# Patient Record
Sex: MALE | Employment: UNEMPLOYED | URBAN - METROPOLITAN AREA
[De-identification: names, ages, dates, MRNs, and addresses within clinical notes are randomized per-mention and may not be internally consistent; named-entity substitution may affect disease eponyms.]

---

## 2022-01-10 ENCOUNTER — APPOINTMENT (OUTPATIENT)
Dept: RADIOLOGY | Facility: MEDICAL CENTER | Age: 52
DRG: 246 | End: 2022-01-10
Attending: EMERGENCY MEDICINE
Payer: COMMERCIAL

## 2022-01-10 ENCOUNTER — HOSPITAL ENCOUNTER (INPATIENT)
Facility: MEDICAL CENTER | Age: 52
LOS: 2 days | DRG: 246 | End: 2022-01-12
Attending: INTERNAL MEDICINE | Admitting: INTERNAL MEDICINE
Payer: COMMERCIAL

## 2022-01-10 ENCOUNTER — APPOINTMENT (OUTPATIENT)
Dept: CARDIOLOGY | Facility: MEDICAL CENTER | Age: 52
DRG: 246 | End: 2022-01-10
Attending: INTERNAL MEDICINE
Payer: COMMERCIAL

## 2022-01-10 DIAGNOSIS — I21.19 ST ELEVATION MYOCARDIAL INFARCTION (STEMI) INVOLVING OTHER CORONARY ARTERY OF INFERIOR WALL (HCC): ICD-10-CM

## 2022-01-10 PROBLEM — E78.5 HYPERLIPIDEMIA: Status: ACTIVE | Noted: 2022-01-10

## 2022-01-10 PROBLEM — I10 HYPERTENSION: Status: ACTIVE | Noted: 2022-01-10

## 2022-01-10 PROBLEM — I50.20 SYSTOLIC HEART FAILURE (HCC): Status: ACTIVE | Noted: 2022-01-10

## 2022-01-10 PROBLEM — I21.3 STEMI (ST ELEVATION MYOCARDIAL INFARCTION) (HCC): Status: ACTIVE | Noted: 2022-01-10

## 2022-01-10 LAB
ACT BLD: 166 SEC (ref 74–137)
ALBUMIN SERPL BCP-MCNC: 4.7 G/DL (ref 3.2–4.9)
ALBUMIN/GLOB SERPL: 1.8 G/DL
ALP SERPL-CCNC: 80 U/L (ref 30–99)
ALT SERPL-CCNC: 50 U/L (ref 2–50)
ANION GAP SERPL CALC-SCNC: 15 MMOL/L (ref 7–16)
AST SERPL-CCNC: 36 U/L (ref 12–45)
BASOPHILS # BLD AUTO: 0.3 % (ref 0–1.8)
BASOPHILS # BLD: 0.04 K/UL (ref 0–0.12)
BILIRUB SERPL-MCNC: 0.6 MG/DL (ref 0.1–1.5)
BUN SERPL-MCNC: 15 MG/DL (ref 8–22)
CALCIUM SERPL-MCNC: 9.5 MG/DL (ref 8.5–10.5)
CHLORIDE SERPL-SCNC: 101 MMOL/L (ref 96–112)
CO2 SERPL-SCNC: 22 MMOL/L (ref 20–33)
CREAT SERPL-MCNC: 0.97 MG/DL (ref 0.5–1.4)
EKG IMPRESSION: NORMAL
EOSINOPHIL # BLD AUTO: 0.07 K/UL (ref 0–0.51)
EOSINOPHIL NFR BLD: 0.5 % (ref 0–6.9)
ERYTHROCYTE [DISTWIDTH] IN BLOOD BY AUTOMATED COUNT: 39.3 FL (ref 35.9–50)
GLOBULIN SER CALC-MCNC: 2.6 G/DL (ref 1.9–3.5)
GLUCOSE SERPL-MCNC: 149 MG/DL (ref 65–99)
HCT VFR BLD AUTO: 43.3 % (ref 42–52)
HGB BLD-MCNC: 15.1 G/DL (ref 14–18)
IMM GRANULOCYTES # BLD AUTO: 0.07 K/UL (ref 0–0.11)
IMM GRANULOCYTES NFR BLD AUTO: 0.5 % (ref 0–0.9)
LYMPHOCYTES # BLD AUTO: 1.76 K/UL (ref 1–4.8)
LYMPHOCYTES NFR BLD: 11.9 % (ref 22–41)
MCH RBC QN AUTO: 30 PG (ref 27–33)
MCHC RBC AUTO-ENTMCNC: 34.9 G/DL (ref 33.7–35.3)
MCV RBC AUTO: 86.1 FL (ref 81.4–97.8)
MONOCYTES # BLD AUTO: 0.86 K/UL (ref 0–0.85)
MONOCYTES NFR BLD AUTO: 5.8 % (ref 0–13.4)
NEUTROPHILS # BLD AUTO: 12.05 K/UL (ref 1.82–7.42)
NEUTROPHILS NFR BLD: 81 % (ref 44–72)
NRBC # BLD AUTO: 0 K/UL
NRBC BLD-RTO: 0 /100 WBC
PLATELET # BLD AUTO: 386 K/UL (ref 164–446)
PMV BLD AUTO: 9.4 FL (ref 9–12.9)
POTASSIUM SERPL-SCNC: 3.7 MMOL/L (ref 3.6–5.5)
PROT SERPL-MCNC: 7.3 G/DL (ref 6–8.2)
RBC # BLD AUTO: 5.03 M/UL (ref 4.7–6.1)
SODIUM SERPL-SCNC: 138 MMOL/L (ref 135–145)
TROPONIN T SERPL-MCNC: 13 NG/L (ref 6–19)
WBC # BLD AUTO: 14.9 K/UL (ref 4.8–10.8)

## 2022-01-10 PROCEDURE — 700105 HCHG RX REV CODE 258: Performed by: INTERNAL MEDICINE

## 2022-01-10 PROCEDURE — 80053 COMPREHEN METABOLIC PANEL: CPT

## 2022-01-10 PROCEDURE — 93458 L HRT ARTERY/VENTRICLE ANGIO: CPT | Mod: 26,59 | Performed by: INTERNAL MEDICINE

## 2022-01-10 PROCEDURE — 700101 HCHG RX REV CODE 250

## 2022-01-10 PROCEDURE — 99221 1ST HOSP IP/OBS SF/LOW 40: CPT | Performed by: STUDENT IN AN ORGANIZED HEALTH CARE EDUCATION/TRAINING PROGRAM

## 2022-01-10 PROCEDURE — 700102 HCHG RX REV CODE 250 W/ 637 OVERRIDE(OP): Performed by: INTERNAL MEDICINE

## 2022-01-10 PROCEDURE — 92941 PRQ TRLML REVSC TOT OCCL AMI: CPT | Mod: LC | Performed by: INTERNAL MEDICINE

## 2022-01-10 PROCEDURE — 700117 HCHG RX CONTRAST REV CODE 255: Performed by: INTERNAL MEDICINE

## 2022-01-10 PROCEDURE — 71045 X-RAY EXAM CHEST 1 VIEW: CPT

## 2022-01-10 PROCEDURE — 027034Z DILATION OF CORONARY ARTERY, ONE ARTERY WITH DRUG-ELUTING INTRALUMINAL DEVICE, PERCUTANEOUS APPROACH: ICD-10-PCS | Performed by: INTERNAL MEDICINE

## 2022-01-10 PROCEDURE — 99221 1ST HOSP IP/OBS SF/LOW 40: CPT | Mod: 25 | Performed by: INTERNAL MEDICINE

## 2022-01-10 PROCEDURE — B2151ZZ FLUOROSCOPY OF LEFT HEART USING LOW OSMOLAR CONTRAST: ICD-10-PCS | Performed by: INTERNAL MEDICINE

## 2022-01-10 PROCEDURE — B240ZZ3 ULTRASONOGRAPHY OF SINGLE CORONARY ARTERY, INTRAVASCULAR: ICD-10-PCS | Performed by: INTERNAL MEDICINE

## 2022-01-10 PROCEDURE — 92978 ENDOLUMINL IVUS OCT C 1ST: CPT | Mod: 26,LC | Performed by: INTERNAL MEDICINE

## 2022-01-10 PROCEDURE — 93005 ELECTROCARDIOGRAM TRACING: CPT | Performed by: EMERGENCY MEDICINE

## 2022-01-10 PROCEDURE — 85025 COMPLETE CBC W/AUTO DIFF WBC: CPT

## 2022-01-10 PROCEDURE — A9270 NON-COVERED ITEM OR SERVICE: HCPCS | Performed by: STUDENT IN AN ORGANIZED HEALTH CARE EDUCATION/TRAINING PROGRAM

## 2022-01-10 PROCEDURE — 700111 HCHG RX REV CODE 636 W/ 250 OVERRIDE (IP)

## 2022-01-10 PROCEDURE — A9270 NON-COVERED ITEM OR SERVICE: HCPCS | Performed by: INTERNAL MEDICINE

## 2022-01-10 PROCEDURE — 700102 HCHG RX REV CODE 250 W/ 637 OVERRIDE(OP)

## 2022-01-10 PROCEDURE — C9606 PERC D-E COR REVASC W AMI S: HCPCS | Mod: LC

## 2022-01-10 PROCEDURE — A9270 NON-COVERED ITEM OR SERVICE: HCPCS

## 2022-01-10 PROCEDURE — 700102 HCHG RX REV CODE 250 W/ 637 OVERRIDE(OP): Performed by: STUDENT IN AN ORGANIZED HEALTH CARE EDUCATION/TRAINING PROGRAM

## 2022-01-10 PROCEDURE — 84484 ASSAY OF TROPONIN QUANT: CPT

## 2022-01-10 PROCEDURE — 770000 HCHG ROOM/CARE - INTERMEDIATE ICU *

## 2022-01-10 PROCEDURE — 85347 COAGULATION TIME ACTIVATED: CPT

## 2022-01-10 PROCEDURE — 4A023N7 MEASUREMENT OF CARDIAC SAMPLING AND PRESSURE, LEFT HEART, PERCUTANEOUS APPROACH: ICD-10-PCS | Performed by: INTERNAL MEDICINE

## 2022-01-10 PROCEDURE — B2111ZZ FLUOROSCOPY OF MULTIPLE CORONARY ARTERIES USING LOW OSMOLAR CONTRAST: ICD-10-PCS | Performed by: INTERNAL MEDICINE

## 2022-01-10 PROCEDURE — 99152 MOD SED SAME PHYS/QHP 5/>YRS: CPT | Performed by: INTERNAL MEDICINE

## 2022-01-10 RX ORDER — HEPARIN SODIUM 1000 [USP'U]/ML
INJECTION, SOLUTION INTRAVENOUS; SUBCUTANEOUS
Status: COMPLETED
Start: 2022-01-10 | End: 2022-01-10

## 2022-01-10 RX ORDER — MAGNESIUM OXIDE 400 MG/1
400 TABLET ORAL DAILY
Status: ON HOLD | COMMUNITY
End: 2022-01-10

## 2022-01-10 RX ORDER — ASPIRIN 300 MG/1
300 SUPPOSITORY RECTAL DAILY
Status: DISCONTINUED | OUTPATIENT
Start: 2022-01-10 | End: 2022-01-10 | Stop reason: CLARIF

## 2022-01-10 RX ORDER — PRASUGREL 10 MG/1
60 TABLET, FILM COATED ORAL ONCE
Status: DISCONTINUED | OUTPATIENT
Start: 2022-01-10 | End: 2022-01-10

## 2022-01-10 RX ORDER — CALCIUM POLYCARBOPHIL 625 MG 625 MG/1
1250 TABLET ORAL
COMMUNITY

## 2022-01-10 RX ORDER — LISINOPRIL 5 MG/1
5 TABLET ORAL TWICE DAILY
Status: DISCONTINUED | OUTPATIENT
Start: 2022-01-10 | End: 2022-01-12

## 2022-01-10 RX ORDER — ATORVASTATIN CALCIUM 20 MG/1
20 TABLET, FILM COATED ORAL EVERY EVENING
COMMUNITY
End: 2022-01-28

## 2022-01-10 RX ORDER — ATORVASTATIN CALCIUM 80 MG/1
80 TABLET, FILM COATED ORAL EVERY EVENING
Status: DISCONTINUED | OUTPATIENT
Start: 2022-01-10 | End: 2022-01-12 | Stop reason: HOSPADM

## 2022-01-10 RX ORDER — ASPIRIN 81 MG/1
324 TABLET, CHEWABLE ORAL DAILY
Status: DISCONTINUED | OUTPATIENT
Start: 2022-01-10 | End: 2022-01-10 | Stop reason: CLARIF

## 2022-01-10 RX ORDER — CAPSAICIN 0.025 %
1 CREAM (GRAM) TOPICAL 3 TIMES DAILY PRN
COMMUNITY
End: 2022-05-19

## 2022-01-10 RX ORDER — IBUPROFEN 600 MG/1
600 TABLET ORAL 3 TIMES DAILY PRN
COMMUNITY
End: 2022-01-28

## 2022-01-10 RX ORDER — SODIUM CHLORIDE 9 MG/ML
INJECTION, SOLUTION INTRAVENOUS CONTINUOUS
Status: CANCELLED | OUTPATIENT
Start: 2022-01-10 | End: 2022-01-10

## 2022-01-10 RX ORDER — PRASUGREL 10 MG/1
60 TABLET, FILM COATED ORAL ONCE
Status: CANCELLED | OUTPATIENT
Start: 2022-01-10 | End: 2022-01-10

## 2022-01-10 RX ORDER — TRIAMCINOLONE ACETONIDE 0.25 MG/G
1 CREAM TOPICAL 2 TIMES DAILY PRN
COMMUNITY
End: 2022-05-19

## 2022-01-10 RX ORDER — SODIUM CHLORIDE 9 MG/ML
INJECTION, SOLUTION INTRAVENOUS CONTINUOUS
Status: ACTIVE | OUTPATIENT
Start: 2022-01-10 | End: 2022-01-10

## 2022-01-10 RX ORDER — PRASUGREL 10 MG/1
10 TABLET, FILM COATED ORAL DAILY
Status: CANCELLED | OUTPATIENT
Start: 2022-01-11

## 2022-01-10 RX ORDER — ACETAMINOPHEN 325 MG/1
650 TABLET ORAL EVERY 6 HOURS PRN
Status: DISCONTINUED | OUTPATIENT
Start: 2022-01-10 | End: 2022-01-12 | Stop reason: HOSPADM

## 2022-01-10 RX ORDER — BIVALIRUDIN 250 MG/5ML
INJECTION, POWDER, LYOPHILIZED, FOR SOLUTION INTRAVENOUS
Status: COMPLETED
Start: 2022-01-10 | End: 2022-01-10

## 2022-01-10 RX ORDER — PRASUGREL 10 MG/1
10 TABLET, FILM COATED ORAL DAILY
Status: DISCONTINUED | OUTPATIENT
Start: 2022-01-11 | End: 2022-01-12 | Stop reason: HOSPADM

## 2022-01-10 RX ORDER — LIDOCAINE HYDROCHLORIDE 20 MG/ML
INJECTION, SOLUTION INFILTRATION; PERINEURAL
Status: COMPLETED
Start: 2022-01-10 | End: 2022-01-10

## 2022-01-10 RX ORDER — CARVEDILOL 6.25 MG/1
6.25 TABLET ORAL 2 TIMES DAILY WITH MEALS
Status: DISCONTINUED | OUTPATIENT
Start: 2022-01-10 | End: 2022-01-12 | Stop reason: HOSPADM

## 2022-01-10 RX ORDER — HEPARIN SODIUM 200 [USP'U]/100ML
INJECTION, SOLUTION INTRAVENOUS
Status: COMPLETED
Start: 2022-01-10 | End: 2022-01-10

## 2022-01-10 RX ORDER — NAPROXEN 250 MG/1
500-750 TABLET ORAL PRN
Status: ON HOLD | COMMUNITY
End: 2022-01-10

## 2022-01-10 RX ORDER — VERAPAMIL HYDROCHLORIDE 2.5 MG/ML
INJECTION, SOLUTION INTRAVENOUS
Status: COMPLETED
Start: 2022-01-10 | End: 2022-01-10

## 2022-01-10 RX ORDER — MIDAZOLAM HYDROCHLORIDE 1 MG/ML
INJECTION INTRAMUSCULAR; INTRAVENOUS
Status: COMPLETED
Start: 2022-01-10 | End: 2022-01-10

## 2022-01-10 RX ORDER — PRASUGREL 10 MG/1
TABLET, FILM COATED ORAL
Status: COMPLETED
Start: 2022-01-10 | End: 2022-01-10

## 2022-01-10 RX ORDER — ASPIRIN 325 MG
325 TABLET ORAL DAILY
Status: DISCONTINUED | OUTPATIENT
Start: 2022-01-10 | End: 2022-01-10 | Stop reason: CLARIF

## 2022-01-10 RX ADMIN — LISINOPRIL 5 MG: 5 TABLET ORAL at 13:56

## 2022-01-10 RX ADMIN — SODIUM CHLORIDE: 9 INJECTION, SOLUTION INTRAVENOUS at 13:55

## 2022-01-10 RX ADMIN — ATROPINE SULFATE 0.5 MG: 0.1 INJECTION INTRAVENOUS at 12:46

## 2022-01-10 RX ADMIN — VERAPAMIL HYDROCHLORIDE 2.5 MG: 2.5 INJECTION, SOLUTION INTRAVENOUS at 12:28

## 2022-01-10 RX ADMIN — ACETAMINOPHEN 650 MG: 325 TABLET, FILM COATED ORAL at 17:15

## 2022-01-10 RX ADMIN — HEPARIN SODIUM: 1000 INJECTION, SOLUTION INTRAVENOUS; SUBCUTANEOUS at 12:27

## 2022-01-10 RX ADMIN — IOHEXOL 100 ML: 350 INJECTION, SOLUTION INTRAVENOUS at 12:47

## 2022-01-10 RX ADMIN — CARVEDILOL 6.25 MG: 6.25 TABLET, FILM COATED ORAL at 17:15

## 2022-01-10 RX ADMIN — PRASUGREL 60 MG: 10 TABLET, FILM COATED ORAL at 12:47

## 2022-01-10 RX ADMIN — ASPIRIN 81 MG: 81 TABLET, COATED ORAL at 13:56

## 2022-01-10 RX ADMIN — FENTANYL CITRATE 100 MCG: 50 INJECTION INTRAMUSCULAR; INTRAVENOUS at 12:42

## 2022-01-10 RX ADMIN — SODIUM CHLORIDE: 9 INJECTION, SOLUTION INTRAVENOUS at 17:16

## 2022-01-10 RX ADMIN — MIDAZOLAM 2 MG: 1 INJECTION INTRAMUSCULAR; INTRAVENOUS at 12:42

## 2022-01-10 RX ADMIN — BIVALIRUDIN 250 MG: 250 INJECTION, POWDER, LYOPHILIZED, FOR SOLUTION INTRAVENOUS at 12:42

## 2022-01-10 RX ADMIN — HEPARIN SODIUM 2000 UNITS: 200 INJECTION, SOLUTION INTRAVENOUS at 12:27

## 2022-01-10 RX ADMIN — ATORVASTATIN CALCIUM 80 MG: 80 TABLET, FILM COATED ORAL at 17:15

## 2022-01-10 RX ADMIN — LIDOCAINE HYDROCHLORIDE: 20 INJECTION, SOLUTION INFILTRATION; PERINEURAL at 12:28

## 2022-01-10 RX ADMIN — NITROGLYCERIN: 20 INJECTION INTRAVENOUS at 12:00

## 2022-01-10 ASSESSMENT — ENCOUNTER SYMPTOMS
DIZZINESS: 0
CHEST TIGHTNESS: 1
HEADACHES: 0
NAUSEA: 0
PALPITATIONS: 0
SHORTNESS OF BREATH: 0
ABDOMINAL PAIN: 0

## 2022-01-10 ASSESSMENT — PAIN DESCRIPTION - PAIN TYPE
TYPE: ACUTE PAIN

## 2022-01-10 ASSESSMENT — PATIENT HEALTH QUESTIONNAIRE - PHQ9
1. LITTLE INTEREST OR PLEASURE IN DOING THINGS: NOT AT ALL
SUM OF ALL RESPONSES TO PHQ9 QUESTIONS 1 AND 2: 0
2. FEELING DOWN, DEPRESSED, IRRITABLE, OR HOPELESS: NOT AT ALL

## 2022-01-10 ASSESSMENT — LIFESTYLE VARIABLES
EVER HAD A DRINK FIRST THING IN THE MORNING TO STEADY YOUR NERVES TO GET RID OF A HANGOVER: NO
TOTAL SCORE: 0
AVERAGE NUMBER OF DAYS PER WEEK YOU HAVE A DRINK CONTAINING ALCOHOL: 0
EVER FELT BAD OR GUILTY ABOUT YOUR DRINKING: NO
CONSUMPTION TOTAL: NEGATIVE
TOTAL SCORE: 0
TOTAL SCORE: 0
ON A TYPICAL DAY WHEN YOU DRINK ALCOHOL HOW MANY DRINKS DO YOU HAVE: 0
HOW MANY TIMES IN THE PAST YEAR HAVE YOU HAD 5 OR MORE DRINKS IN A DAY: 0
ALCOHOL_USE: NO
HAVE YOU EVER FELT YOU SHOULD CUT DOWN ON YOUR DRINKING: NO
DOES PATIENT WANT TO STOP DRINKING: NO
HAVE PEOPLE ANNOYED YOU BY CRITICIZING YOUR DRINKING: NO

## 2022-01-10 ASSESSMENT — FIBROSIS 4 INDEX: FIB4 SCORE: 0.69

## 2022-01-10 NOTE — PROGRESS NOTES
4 Eyes Skin Assessment Completed by SOFIA Gallagher and SOFIA Meneses.    Head WDL  Ears WDL  Nose WDL  Mouth WDL  Neck WDL  Breast/Chest WDL  Shoulder Blades WDL  Spine WDL  (R) Arm/Elbow/Hand WDL  (L) Arm/Elbow/Hand WDL  Abdomen WDL  Groin WDL  Scrotum/Coccyx/Buttocks WDL  (R) Leg WDL  (L) Leg WDL  (R) Heel/Foot/Toe WDL  (L) Heel/Foot/Toe WDL          Devices In Places ECG, Blood Pressure Cuff and Pulse Ox      Interventions In Place Q2 Turns    Possible Skin Injury No    Pictures Uploaded Into Epic N/A  Wound Consult Placed N/A  RN Wound Prevention Protocol Ordered No

## 2022-01-10 NOTE — CONSULTS
Cardiology Initial Consultation    Date of Service  1/10/2022    Referring Physician  Adrianna Macaky M.D.    Reason for Consultation  Inferior STEMI    History of Presenting Illness  Three [place] is a 51 y.o. male with a past medical history of hypertension, dyslipidemia and remote tobacco use who has been transferred from [facility] on 1/10/2022 with the development of chest pain well exercising, working out around 930 this morning radiating to his left back and arm with shortness of breath.  EMS was contacted.  The patient received aspirin and 3 nitroglycerin with persistent pain.  EKG showed inferior ST elevation.    The patient continues to have chest pain.  Vital signs are stable.  EKG continues to show inferior ST elevation with high lateral reciprocal changes.  The patient has no prior history of heart disease.  He denies any current drug use.  No family history of premature heart disease.  Currently on HCTZ and atorvastatin.    Review of Systems  Review of Systems   Respiratory: Positive for chest tightness. Negative for shortness of breath.    Cardiovascular: Negative for palpitations.   Gastrointestinal: Negative for abdominal pain and nausea.   Neurological: Negative for dizziness and headaches.       Past Medical History   has no past medical history on file.    Surgical History   has no past surgical history on file.    Family History  No family history of heart disease.    Social History   See above    Medications  None       Allergies  Allergies   Allergen Reactions   • Penicillins Unspecified     Unknown reaction from childhood       Vital signs in last 24 hours   /67.  Pulse 76.    Physical Exam  Physical Exam  Constitutional:       General: He is not in acute distress.  HENT:      Head: Normocephalic.   Eyes:      General: No scleral icterus.     Conjunctiva/sclera: Conjunctivae normal.      Pupils: Pupils are equal, round, and reactive to light.   Neck:      Thyroid: No  thyromegaly.      Vascular: No carotid bruit.      Comments: Normal jugular venous pressure.  Cardiovascular:      Rate and Rhythm: Normal rate and regular rhythm.      Pulses:           Carotid pulses are 1+ on the right side and 1+ on the left side.       Radial pulses are 1+ on the right side and 1+ on the left side.        Posterior tibial pulses are 1+ on the right side and 1+ on the left side.      Heart sounds: S1 normal and S2 normal. No murmur heard.  No friction rub. No gallop.    Pulmonary:      Effort: Pulmonary effort is normal.      Breath sounds: Normal breath sounds. No wheezing, rhonchi or rales.   Abdominal:      General: Bowel sounds are normal. There is no abdominal bruit.      Palpations: Abdomen is soft. There is no mass or pulsatile mass.      Tenderness: There is no abdominal tenderness.   Lymphadenopathy:      Cervical: No cervical adenopathy.   Skin:     General: Skin is warm and dry.      Nails: There is no clubbing.   Neurological:      Mental Status: He is alert and oriented to person, place, and time.   Psychiatric:         Behavior: Behavior normal.         Lab Review  No results found for: WBC, RBC, HEMOGLOBIN, HEMATOCRIT, MCV, MCH, MCHC, MPV   No results found for: SODIUM, POTASSIUM, CHLORIDE, CO2, GLUCOSE, BUN, CREATININE, BUNCREATRAT, GLOMRATE   No results found for: ASTSGOT, ALTSGPT  No results found for: CHOLSTRLTOT, LDL, HDL, TRIGLYCERIDE, TROPONINT    No results for input(s): NTPROBNP in the last 72 hours.    Cardiac Imaging and Procedures Review  EKG:  My personal interpretation of the EKG dated 1/10/2022 is sinus rhythm with an acute injury current with ST elevation and reciprocal high lateral ST segment depression.    Imaging  Chest X-Ray: 1/10/2022  No acute abnormalities.    Assessment  1.  Anterior STEMI.  2.  Hypertension.  3.  Dyslipidemia.  4.  Remote tobacco use.    Recommendation Discussion  1.  The patient is a 51-year-old male with history of hypertension  dyslipidemia prior tobacco use who presents with an acute inferior myocardial infarction.  2.  I had a detailed concise discussion with the patient informing of his current critical cardiac condition and recommendation for emergent cardiac catheterization which she expressed understanding and wished to proceed.  3.  The patient received aspirin 3 and 25 mg prior to presentation.  4.  In the ER the patient received symptomatic relief with IV morphine and 5000 units of heparin.    Thank you for allowing me to participate in the care of this patient.      Please contact me with any questions.    Johnny Briscoe M.D.   Cardiologist, [facility]  [phone]

## 2022-01-10 NOTE — ASSESSMENT & PLAN NOTE
"Admitted for STEMI   S/p left heart cath   Found to have \"Severe LV systolic dysfunction at 30% LVEF, with apical akinesis-suggesting superimposed Takotsubo cardiomyopathy. \"  Pt was initiated on aspirin, prasugrel, Coreg, lisinopril, Lipitor.  Cardiology following.   Echo pending      "

## 2022-01-10 NOTE — PROCEDURES
"CARDIAC CATHETERIZATION REPORT    Referring Provider: Johnny Briscoe M.D.    PROCEDURE PHYSICIAN: Dino Malik MD, [facility], [facility]  ASSISTANT: None    IMPRESSIONS:  1. Inferior STEMI due to occluded circumflex  2. Successful PCI of the mid circumflex using one KENNY, IVUS guidance  3.  Severe LV systolic dysfunction at 30% LVEF, with apical akinesis-suggesting superimposed Takotsubo cardiomyopathy  4.  Moderate elevation LVEDP at 25 mmHg    Recommendations:  Usual post PCI care  Further recommendations per the rounding team  Angiomax for 4 hours.    Pre-procedure diagnosis: STEMI  Post-procedure diagnosis: Same    Procedure performed  Selective coronary angiography  Left heart catheterization  Percutaneous coronary intervention - Stent Placement (Circumflex)  Intravascular Ultrasound (Circumflex)    Conscious sedation was supervised by myself and administered by trained personnel using fentanyl and versed between 1220 and 1248. The patient tolerated sedation without complication.     Procedure Description  1. Access: 5/6 Irish right radial artery Micropuncture technique was utilized following local anesthesia with lidocaine.  A radial cocktail containing verapamil and saline was administered in the radial artery sheath    2. Diagnostic description: The catheter was passed to the central circulation with the aide of J tipped 0.35\" wire. A 5F TIG 4.0, 6F Pigtail and 6F EBU 3.5 were used to inject the coronary circulation and inject the left ventricle during invasive hemodynamic monitoring.     3. Description of Intervention: After confirming therapeutic anticoagulation intervention proceeded. A 6F EBU 3.5 guiding catheter was used. The lesion in the circumflex was crossed with a 0.014\" Prowater wire.  The lesion was predilated with a 4.0 x 12 mm NC balloon.  I then deployed a 4.0 x 22 mm [place] drug-eluting stent at nominal atmospheres.  I then performed IVUS after administration of intracoronary nitroglycerin which " demonstrated excellent stent sizing as well as expansion throughout.  Subsequent angiogram showed excellent result.    4. Closing: At completion of the procedure the relevant equipment was removed from the body and hemostasis achieved by Radial band    Findings   Hemodynamics:   Aorta: 162/110 mmHg  LVEDP: 25 mmHg  No significant pullback gradient across the aortic valve    Coronary Anatomy   Left Main: Normal   LAD: Mid 25% stenosis, distal 25% stenosis.   LCx: 100% occlusion after the origin of the first, small obtuse marginal vessel.   RCA: Dominant, 25% proximal stenosis.  The PDA and posterior lateral branches are normal.     Left Ventriculography:   LVEF= 30%. Akinesis in the Mid and apical anterior wall and apical inferior wall    Results of intervention to the circumflex:  Pre: 100% stenosis and SANIYA 0 flow  Post: 0% residual stenosis and SANIYA III flow. No dissection or distal embolization.    Technical Factors  1. Complications: None  2. Estimated Blood Loss: <50 cc  3. Specimens: None  4. Contrast Volume: 100 ml  5. Medications: Radial cocktail (Verapamil 2.5 mg, Nitroglycerin 100 mcg) Heparin 5000 Units Bivalirudin Prasugrel 60 mg Intracoronary nitroglycerin -Angiomax chosen as the anticoagulant after 10 units/kg of heparin resulted in ACT of only 160 seconds.  6. Radiation (Air Kerma): 289 mGy

## 2022-01-10 NOTE — ED NOTES
Med Rec partially completed per patient   Allergies reviewed  No ORAL antibiotics in last 30 days    Patient reports getting medications from the  [place] [facility]   Called and could not reach at this time.

## 2022-01-10 NOTE — CONSULTS
"Hospital Medicine Consultation    Date of Service  1/10/2022    Referring Physician  Dino Malik M.D.    Consulting Physician  Sherine Cummins M.D.    Reason for Consultation  Medical management    History of Presenting Illness  51 y.o. male with medical history significant for hypertension, hyperlipidemia, remote tobacco use who was being transferred from [facility] 1/10/2022 with c/o chest pain.  Chest pain started acutely while the patient was walking around, the pain is radiating to left shoulder and back, associated with shortness of breath, nausea, diaphoresis and dizziness..  EMS was called, patient was received full dose aspirin and three doses of nitroglycerin with persistent chest pain.  EKG showed inferior ST elevation.  Upon arrival to the ER, cardiology was consulted and the patient was taken to Cath Lab right away.  Per cardiology, \" Successful PCI of the mid circumflex using one KENNY, IVUS guidance   Severe LV systolic dysfunction at 30% LVEF, with apical akinesis-suggesting superimposed Takotsubo cardiomyopathy. \"  Pt was initiated on aspirin, prasugrel, Coreg, lisinopril, Lipitor.  Cardiology following.     Review of Systems  ROS    Past Medical History   has no past medical history on file.    Surgical History   has no past surgical history on file.    Family History  family history is not on file.    Social History       Medications  Prior to Admission Medications   Prescriptions Last Dose Informant Patient Reported? Taking?   ATORVASTATIN CALCIUM PO 1/9/2022 at pm  Yes Yes   Sig: Take 1 Tablet by mouth every day.   GINKGO BILOBA PO 1/10/2022 at 0630  Yes Yes   Sig: Take 1 Tablet by mouth every day.   Kraig, Zingiber officinalis, (KRAIG PO) 1/10/2022 at 0630  Yes Yes   Sig: Take 1 Tablet by mouth every day.   Multiple Vitamin (MULTI-VITAMIN DAILY PO) 1/10/2022 at 0630  Yes Yes   Sig: Take 1 Tablet by mouth every day.   NON SPECIFIED 1/10/2022 at 0630  Yes Yes   Sig: Take " "4,000 mg by mouth every day. Amino   NON SPECIFIED 1/10/2022 at 0630  Yes Yes   Sig: Take 2 Tablets by mouth every day. \"Fat burner\"   NON SPECIFIED 1/10/2022 at 0630  Yes Yes   Sig: Take 1 Tablet by mouth every day. Bacopa Monnieri   magnesium oxide (MAG-OX) 400 MG Tab tablet 1/10/2022 at 0630  Yes Yes   Sig: Take 400 mg by mouth every day.   naproxen (NAPROSYN) 250 MG Tab 1/9/2022 at Unknown time  Yes Yes   Sig: Take 500-750 mg by mouth as needed (Pain).      Facility-Administered Medications: None       Allergies  Allergies   Allergen Reactions   • Penicillins Unspecified     Unknown reaction from childhood       Physical Exam  Temp:  [35.9 °C (96.6 °F)] 35.9 °C (96.6 °F)  Pulse:  [80-91] 91  Resp:  [13-28] 28  BP: (127-140)/() 140/105  SpO2:  [93 %-99 %] 93 %    Physical Exam    Fluids      Laboratory  Recent Labs     01/10/22  1141   WBC 14.9*   RBC 5.03   HEMOGLOBIN 15.1   HEMATOCRIT 43.3   MCV 86.1   MCH 30.0   MCHC 34.9   RDW 39.3   PLATELETCT 386   MPV 9.4     Recent Labs     01/10/22  1141   SODIUM 138   POTASSIUM 3.7   CHLORIDE 101   CO2 22   GLUCOSE 149*   BUN 15   CREATININE 0.97   CALCIUM 9.5                     Imaging  DX-CHEST-LIMITED (1 VIEW)   Final Result      No acute cardiopulmonary abnormality.      CL-LEFT HEART CATHETERIZATION WITH POSSIBLE INTERVENTION    (Results Pending)       Assessment/Plan  Systolic heart failure (HCC)  Assessment & Plan  Admitted for STEMI   S/p left heart cath   Found to have \"Severe LV systolic dysfunction at 30% LVEF, with apical akinesis-suggesting superimposed Takotsubo cardiomyopathy. \"  Pt was initiated on aspirin, prasugrel, Coreg, lisinopril, Lipitor.  Cardiology following.         STEMI (ST elevation myocardial infarction) (HCC)  Assessment & Plan   c/o chest pain.  Chest pain started acutely while the patient was walking around, the pain is radiating to left shoulder and back, associated with shortness of breath, nausea, diaphoresis and dizziness..  " "EMS was called, patient was received full dose aspirin and three doses of nitroglycerin with persistent chest pain.  EKG showed inferior ST elevation.  Upon arrival to the ER, cardiology was consulted and the patient was taken to Cath Lab right away.  Per cardiology, \" Successful PCI of the mid circumflex using one KENNY, IVUS guidance   Severe LV systolic dysfunction at 30% LVEF, with apical akinesis-suggesting superimposed Takotsubo cardiomyopathy. \"  Pt was initiated on aspirin, prasugrel, Coreg, lisinopril, Lipitor.  Cardiology following.   A1C   Lipid panel         Hypertension  Assessment & Plan  S/p cardiac cath ,  Successful PCI of the mid circumflex using one KENNY  Pt was initiated on  Coreg, lisinopril.         Hyperlipidemia  Assessment & Plan  Lipid panel   Lipitor       "

## 2022-01-10 NOTE — ASSESSMENT & PLAN NOTE
"With emergent cardiac catheterization on 1/10:   \" Successful PCI of the mid circumflex using one KENNY, IVUS guidance   Severe LV systolic dysfunction at 30% LVEF, with apical akinesis-suggesting superimposed Takotsubo cardiomyopathy. \"  Pt was initiated on aspirin, prasugrel, Coreg, lisinopril, Lipitor.  Cardiology following.   Continuous telemetry monitoring to evaluate for post-perfusion arrhythmias  Lipid panel ordered  Echocardiogram pending      "

## 2022-01-10 NOTE — ASSESSMENT & PLAN NOTE
S/p cardiac cath ,  Successful PCI of the mid circumflex using one KENNY  Pt was initiated on  Coreg, lisinopril.

## 2022-01-10 NOTE — ED PROVIDER NOTES
ED Provider Note    Scribed for Adrianna Mackay M.D. by Manpreet Spangler. 1/10/2022  11:57 AM    Primary care provider: No primary care provider noted.  Means of arrival: EMS  History obtained from: Patient  History limited by: None  CHIEF COMPLAINT  STEMI      HPI  Three Han is a 51 y.o. male who presents via EMS as a transfer from [facility] for evaluation of upper left chest pain onset at 9:30 AM this morning. He notes that he felt perfectly fine yesterday. He says his chest pain started while he was exercising. His chest pain was radiating to his back, left shoulder and left arm. He has associated shortness of breath, nausea, diaphoresis and dizziness that he describes as lightheadedness. He denies any abdominal pain. He has his COVID-19 vaccination but is lacking his booster. He has a history of hyperlipidemia, which he takes medications for. He has no history of diabetes, hypertension, strokes, or blood clots. He has allergies to penicillin.    REVIEW OF SYSTEMS  Pertinent positives include: chest pain, shortness of breath, diaphoresis, nausea and dizziness. Pertinent Negatives include: abdominal pain.    See HPI for further details. All other systems are negative.    PAST MEDICAL HISTORY  No past medical history noted.    FAMILY HISTORY  No family history noted.    SOCIAL HISTORY  Social History     Socioeconomic History   • Marital status: Not noted     Spouse name: Not noted   • Number of children: Not noted   • Years of education: Not noted   • Highest education level: Not noted   Occupational History   • Not noted   Tobacco Use   • Smoking status: Not noted   • Smokeless tobacco: Not noted   Substance and Sexual Activity   • Alcohol use: Not noted   • Drug use: Not noted   • Sexual activity: Not noted   Other Topics Concern   • Not noted   Social History Narrative   • Not noted     Social Determinants of Health     Financial Resource Strain:    • Difficulty of Paying Living  "Expenses: Not noted   Food Insecurity:    • Worried About Running Out of Food in the Last Year: Not noted   • Ran Out of Food in the Last Year: Not noted   Transportation Needs:    • Lack of Transportation (Medical): Not noted   • Lack of Transportation (Non-Medical): Not noted   Physical Activity:    • Days of Exercise per Week: Not noted   • Minutes of Exercise per Session: Not noted   Stress:    • Feeling of Stress : Not noted   Social Connections:    • Frequency of Communication with Friends and Family: Not noted   • Frequency of Social Gatherings with Friends and Family: Not noted   • Attends Buddhist Services: Not noted   • Active Member of Clubs or Organizations: Not noted   • Attends Club or Organization Meetings: Not noted   • Marital Status: Not noted   Intimate Partner Violence:    • Fear of Current or Ex-Partner: Not noted   • Emotionally Abused: Not noted   • Physically Abused: Not noted   • Sexually Abused: Not noted   Housing Stability:    • Unable to Pay for Housing in the Last Year: Not noted   • Number of Places Lived in the Last Year: Not noted   • Unstable Housing in the Last Year: Not noted       SURGICAL HISTORY  No past surgical history noted.    CURRENT MEDICATIONS  Current Outpatient Medications   Medication Instructions   • ATORVASTATIN CALCIUM PO 1 Tablet, Oral, DAILY   • Shea, Zingiber officinalis, (SHEA PO) 1 Tablet, Oral, DAILY   • GINKGO BILOBA PO 1 Tablet, Oral, DAILY   • magnesium oxide (MAG-OX) 400 mg, Oral, DAILY   • Multiple Vitamin (MULTI-VITAMIN DAILY PO) 1 Tablet, Oral, DAILY   • naproxen (NAPROSYN) 500-750 mg, Oral, PRN   • NON SPECIFIED 4,000 mg, Oral, DAILY, Amino    • NON SPECIFIED 2 Tablets, Oral, DAILY, \"Fat burner\"    • NON SPECIFIED 1 Tablet, Oral, DAILY, Bacopa Monnieri        ALLERGIES  Allergies   Allergen Reactions   • Penicillins Unspecified     Unknown reaction from childhood       PHYSICAL EXAM  VITAL SIGNS: Ht 1.753 m (5' 9\")   Wt 99.8 kg (220 lb)   BMI " 32.49 kg/m²      Constitutional: Well developed, well nourished; mild acute distress; ill-appearing, diaphoretic.  Arrives in handcuffs  HENT: Normocephalic, atraumatic; Bilateral external ears normal; Oropharyngeal exam deferred to COVID-19 outbreak and lack of oropharyngeal complaints  Eyes: PERRL, EOMI, Conjunctiva normal. No discharge.   Neck:  Supple, nontender midline; No stridor; No nuchal rigidity.   Lymphatic: No cervical lymphadenopathy noted.   Cardiovascular: Regular rate and rhythm without obvious murmurs, rubs, or gallop.   Thorax & Lungs: No respiratory distress, breath sounds clear to auscultation bilaterally without wheezing, rales or rhonchi. Nontender chest wall. No crepitus or subcutaneous air  Abdomen: Soft, nontender, bowel sounds normal, no edema. No obvious masses; No pulsatile masses; no rebound, guarding, or peritoneal signs.   Skin: Good color; warm and dry without rash or petechia.  Back: Nontender, No CVA tenderness.   Extremities: symmetric radial and pedal pulses throughout; No edema; Nontender calves or saphenous, No cyanosis, No clubbing.   Musculoskeletal: Good range of motion in all major joints. No tenderness to palpation or major deformities noted.   Neurologic: Alert & oriented x 4, clear speech      EKG  12 lead EKG interpreted by me as noted below.    LABS/RADIOLOGY/PROCEDURES  Results for orders placed or performed during the hospital encounter of 01/10/22   CBC with Differential   Result Value Ref Range    WBC 14.9 (H) 4.8 - 10.8 K/uL    RBC 5.03 4.70 - 6.10 M/uL    Hemoglobin 15.1 14.0 - 18.0 g/dL    Hematocrit 43.3 42.0 - 52.0 %    MCV 86.1 81.4 - 97.8 fL    MCH 30.0 27.0 - 33.0 pg    MCHC 34.9 33.7 - 35.3 g/dL    RDW 39.3 35.9 - 50.0 fL    Platelet Count 386 164 - 446 K/uL    MPV 9.4 9.0 - 12.9 fL    Neutrophils-Polys 81.00 (H) 44.00 - 72.00 %    Lymphocytes 11.90 (L) 22.00 - 41.00 %    Monocytes 5.80 0.00 - 13.40 %    Eosinophils 0.50 0.00 - 6.90 %    Basophils 0.30 0.00  - 1.80 %    Immature Granulocytes 0.50 0.00 - 0.90 %    Nucleated RBC 0.00 /100 WBC    Neutrophils (Absolute) 12.05 (H) 1.82 - 7.42 K/uL    Lymphs (Absolute) 1.76 1.00 - 4.80 K/uL    Monos (Absolute) 0.86 (H) 0.00 - 0.85 K/uL    Eos (Absolute) 0.07 0.00 - 0.51 K/uL    Baso (Absolute) 0.04 0.00 - 0.12 K/uL    Immature Granulocytes (abs) 0.07 0.00 - 0.11 K/uL    NRBC (Absolute) 0.00 K/uL   Complete Metabolic Panel (CMP)   Result Value Ref Range    Sodium 138 135 - 145 mmol/L    Potassium 3.7 3.6 - 5.5 mmol/L    Chloride 101 96 - 112 mmol/L    Co2 22 20 - 33 mmol/L    Anion Gap 15.0 7.0 - 16.0    Glucose 149 (H) 65 - 99 mg/dL    Bun 15 8 - 22 mg/dL    Creatinine 0.97 0.50 - 1.40 mg/dL    Calcium 9.5 8.5 - 10.5 mg/dL    AST(SGOT) 36 12 - 45 U/L    ALT(SGPT) 50 2 - 50 U/L    Alkaline Phosphatase 80 30 - 99 U/L    Total Bilirubin 0.6 0.1 - 1.5 mg/dL    Albumin 4.7 3.2 - 4.9 g/dL    Total Protein 7.3 6.0 - 8.2 g/dL    Globulin 2.6 1.9 - 3.5 g/dL    A-G Ratio 1.8 g/dL   TROPONIN   Result Value Ref Range    Troponin T 13 6 - 19 ng/L   ESTIMATED GFR   Result Value Ref Range    GFR If African American >60 >60 mL/min/1.73 m 2    GFR If Non African American >60 >60 mL/min/1.73 m 2   EKG - STAT Once   Result Value Ref Range    Report       [facility] Emergency Dept.    Test Date:  2022-01-10  Pt Name:    SUZIE EDCHILTON              Department: ER  MRN:        [id number]                      Room:       TRAUMA - EXAM 1  Gender:     Male                         Technician: [id number]  :        1970                   Requested By:PHOENIX MACKAY  Order #:    [id number]                    Reading MD: Phoenix Mackay    Measurements  Intervals                                Axis  Rate:       76                           P:          65  KY:         180                          QRS:        75  QRSD:       98                           T:          86  QT:         424  QTc:        477    Interpretive Statements  SINUS RHYTHM  rate of 76  Normal axis  Normal intervals  ST elevation in lead II, three, aVF with reciprocal ST depression in lead I  and  aVL  INFERIOR INJURY, PROBABLE EARLY ACUTE INFARCT  CONSIDER POSTERIOR WALL INVOLVEMENT  BORDERLINE PROLONGED QT INTERVAL  BASELINE WANDER IN LEAD(S) V1  No previous ECG ewa ilable for comparison  Electronically Signed On 1- 12:16:08 PST by Adrianna Mackay           DX-CHEST-LIMITED (1 VIEW)   Final Result      No acute cardiopulmonary abnormality.      CL-LEFT HEART CATHETERIZATION WITH POSSIBLE INTERVENTION    (Results Pending)       COURSE & MEDICAL DECISION MAKING  Pertinent Labs & Imaging studies reviewed. (See chart for details)    11:27 AM - Patient seen and examined at bedside. Discussed plan of care, including admission to the catheter lab. Patient agrees to the plan of care. Ordered for labs and imaging to evaluate his symptoms.     11:39 AM - Julio Cesar Hospitalist.    11:50 AM - I discussed the patient's case and the above findings with Dr. Cummins (Hospitalist) who will see the patient.    12:00 PM - Dr. Cummins has reviewed chart and states that Dr. Lyn wishes for the patient to be admitted to the ICU. Pageavery Intensivist.    12:34 PM - I discussed the patient's case and the above findings with Dr. Cole (Intensivist) who agrees to admit the patient to the ICU    Patient presents to the ER as a code STEMI from the correctional facility in [place] patient is an inmate.  He developed chest pain while working out today around 930.  Chest pain is left-sided and radiates into his left upper extremity, back, and left shoulder.  He is diaphoretic upon arrival.  He describes lightheadedness.  He says he feels short of breath and a bit nauseated.  He was given nitroglycerin prior to arrival.  He says the nitroglycerin helped for a while but the pain has come back.  He was given a total of 8 mg of morphine here in the ER while waiting transfer to Cath Lab.  Dr. Lyn was at  bedside upon patient arrival and evaluated the patient alongside me.  Patient indeed does have a inferior STEMI based upon EMS EKG and based upon repeat EKG here in the ER upon arrival.  Patient's blood pressure was in the 170s systolic upon arrival.  He was in a sinus rhythm.  No ectopy.  Patient was prepared for the Cath Lab.  Chest x-ray was obtained and is negative.  Since patient will be going to Cath Lab expeditiously he was given some heparin here in the ER.  After Cath Lab patient will go to ICU.  Paged intensivist at this time further evaluation and management will be done by intensivist, cardiologist, and hospitalist.    CRITICAL CARE  The very real possibilty of a deterioration of this patient's condition required the highest level of my preparedness for sudden, emergent intervention.  I provided critical care services, which included medication orders, frequent reevaluations of the patient's condition and response to treatment, ordering and reviewing test results, and discussing the case with various consultants.  The critical care time associated with the care of the patient was 35 minutes. Review chart for interventions. This time is exclusive of any other billable procedures.     DISPOSITION:  Patient will be hospitalized by Dr. Cole in guarded condition.    FINAL IMPRESSION  1. ST elevation myocardial infarction (STEMI) involving other coronary artery of inferior wall (HCC)       The critical care time associated with the care of the patient was 35 minutes.    This dictation has been created using voice recognition software. The accuracy of the dictation is limited by the abilities of the software. I expect there may be some errors of grammar and possibly content. I made every attempt to manually correct the errors within my dictation. However, errors related to voice recognition software may still exist and should be interpreted within the appropriate context.      I, Manpreet Spangler (Lindseyibbeth),  am scribing for, and in the presence of, Adrianna Mackay M.D..    Electronically signed by: Manpreet Spangler (Scribe), 1/10/2022    IAdrianna M.D. personally performed the services described in this documentation, as scribed by Manpreet Spangler in my presence, and it is both accurate and complete.    The note accurately reflects work and decisions made by me.  Adrianna Mackay M.D.  1/10/2022  12:18 PM     C

## 2022-01-10 NOTE — CONSULTS
This patient has been admitted to the ICU for STEMI and remains under the care of cardiology (who all questions and concerns should be relayed to).  While a critical care consultation has not been requested, we are immediately available if the patient requires critical care in the future.     Roc Edwards M.D.

## 2022-01-10 NOTE — PROGRESS NOTES
Med rec updated/complete per nurse's station at [facility] ([phone]) for medication list and patient at bedside to verify times of last doses, as patient's medications are KOP (keep on person).  Per patient, no oral antibiotics within last 30 days.

## 2022-01-10 NOTE — PROGRESS NOTES
Assumed care of patient from Cath lab, RN. RALEIGH. All drips verified. Call light in reach, bed in lowest position.

## 2022-01-11 ENCOUNTER — APPOINTMENT (OUTPATIENT)
Dept: CARDIOLOGY | Facility: MEDICAL CENTER | Age: 52
DRG: 246 | End: 2022-01-11
Attending: NURSE PRACTITIONER
Payer: COMMERCIAL

## 2022-01-11 LAB
ANION GAP SERPL CALC-SCNC: 12 MMOL/L (ref 7–16)
BUN SERPL-MCNC: 14 MG/DL (ref 8–22)
CALCIUM SERPL-MCNC: 8.9 MG/DL (ref 8.5–10.5)
CHLORIDE SERPL-SCNC: 102 MMOL/L (ref 96–112)
CO2 SERPL-SCNC: 24 MMOL/L (ref 20–33)
CREAT SERPL-MCNC: 1 MG/DL (ref 0.5–1.4)
ERYTHROCYTE [DISTWIDTH] IN BLOOD BY AUTOMATED COUNT: 40.5 FL (ref 35.9–50)
GLUCOSE SERPL-MCNC: 102 MG/DL (ref 65–99)
HCT VFR BLD AUTO: 41.2 % (ref 42–52)
HGB BLD-MCNC: 14.2 G/DL (ref 14–18)
LV EJECT FRACT  99904: 45
LV EJECT FRACT MOD 2C 99903: 13.52
LV EJECT FRACT MOD 4C 99902: 53.99
LV EJECT FRACT MOD BP 99901: 35.09
MCH RBC QN AUTO: 30 PG (ref 27–33)
MCHC RBC AUTO-ENTMCNC: 34.5 G/DL (ref 33.7–35.3)
MCV RBC AUTO: 87.1 FL (ref 81.4–97.8)
PLATELET # BLD AUTO: 324 K/UL (ref 164–446)
PMV BLD AUTO: 9.1 FL (ref 9–12.9)
POTASSIUM SERPL-SCNC: 3.8 MMOL/L (ref 3.6–5.5)
RBC # BLD AUTO: 4.73 M/UL (ref 4.7–6.1)
SODIUM SERPL-SCNC: 138 MMOL/L (ref 135–145)
WBC # BLD AUTO: 11.6 K/UL (ref 4.8–10.8)

## 2022-01-11 PROCEDURE — 36415 COLL VENOUS BLD VENIPUNCTURE: CPT

## 2022-01-11 PROCEDURE — 93306 TTE W/DOPPLER COMPLETE: CPT

## 2022-01-11 PROCEDURE — 85027 COMPLETE CBC AUTOMATED: CPT

## 2022-01-11 PROCEDURE — 99232 SBSQ HOSP IP/OBS MODERATE 35: CPT | Performed by: NURSE PRACTITIONER

## 2022-01-11 PROCEDURE — 700102 HCHG RX REV CODE 250 W/ 637 OVERRIDE(OP): Performed by: INTERNAL MEDICINE

## 2022-01-11 PROCEDURE — A9270 NON-COVERED ITEM OR SERVICE: HCPCS | Performed by: HOSPITALIST

## 2022-01-11 PROCEDURE — A9270 NON-COVERED ITEM OR SERVICE: HCPCS | Performed by: INTERNAL MEDICINE

## 2022-01-11 PROCEDURE — 700102 HCHG RX REV CODE 250 W/ 637 OVERRIDE(OP): Performed by: STUDENT IN AN ORGANIZED HEALTH CARE EDUCATION/TRAINING PROGRAM

## 2022-01-11 PROCEDURE — 80048 BASIC METABOLIC PNL TOTAL CA: CPT

## 2022-01-11 PROCEDURE — 770020 HCHG ROOM/CARE - TELE (206)

## 2022-01-11 PROCEDURE — 99233 SBSQ HOSP IP/OBS HIGH 50: CPT | Performed by: HOSPITALIST

## 2022-01-11 PROCEDURE — 93306 TTE W/DOPPLER COMPLETE: CPT | Mod: 26 | Performed by: INTERNAL MEDICINE

## 2022-01-11 PROCEDURE — 700102 HCHG RX REV CODE 250 W/ 637 OVERRIDE(OP): Performed by: HOSPITALIST

## 2022-01-11 PROCEDURE — 99285 EMERGENCY DEPT VISIT HI MDM: CPT

## 2022-01-11 PROCEDURE — A9270 NON-COVERED ITEM OR SERVICE: HCPCS | Performed by: STUDENT IN AN ORGANIZED HEALTH CARE EDUCATION/TRAINING PROGRAM

## 2022-01-11 RX ORDER — AMOXICILLIN 250 MG
2 CAPSULE ORAL 2 TIMES DAILY
Status: DISCONTINUED | OUTPATIENT
Start: 2022-01-11 | End: 2022-01-12 | Stop reason: HOSPADM

## 2022-01-11 RX ORDER — AMOXICILLIN 250 MG
1 CAPSULE ORAL DAILY
Status: DISCONTINUED | OUTPATIENT
Start: 2022-01-11 | End: 2022-01-11

## 2022-01-11 RX ORDER — POLYETHYLENE GLYCOL 3350 17 G/17G
1 POWDER, FOR SOLUTION ORAL
Status: DISCONTINUED | OUTPATIENT
Start: 2022-01-11 | End: 2022-01-12 | Stop reason: HOSPADM

## 2022-01-11 RX ORDER — BISACODYL 10 MG
10 SUPPOSITORY, RECTAL RECTAL DAILY
Status: DISCONTINUED | OUTPATIENT
Start: 2022-01-11 | End: 2022-01-12 | Stop reason: HOSPADM

## 2022-01-11 RX ADMIN — ACETAMINOPHEN 650 MG: 325 TABLET, FILM COATED ORAL at 04:11

## 2022-01-11 RX ADMIN — PRASUGREL 10 MG: 10 TABLET, FILM COATED ORAL at 06:47

## 2022-01-11 RX ADMIN — CARVEDILOL 6.25 MG: 6.25 TABLET, FILM COATED ORAL at 17:51

## 2022-01-11 RX ADMIN — POLYETHYLENE GLYCOL 3350 1 PACKET: 17 POWDER, FOR SOLUTION ORAL at 17:51

## 2022-01-11 RX ADMIN — ACETAMINOPHEN 650 MG: 325 TABLET, FILM COATED ORAL at 17:51

## 2022-01-11 RX ADMIN — SENNOSIDES AND DOCUSATE SODIUM 1 TABLET: 50; 8.6 TABLET ORAL at 08:55

## 2022-01-11 RX ADMIN — CARVEDILOL 6.25 MG: 6.25 TABLET, FILM COATED ORAL at 08:30

## 2022-01-11 RX ADMIN — ATORVASTATIN CALCIUM 80 MG: 80 TABLET, FILM COATED ORAL at 17:51

## 2022-01-11 RX ADMIN — LISINOPRIL 5 MG: 5 TABLET ORAL at 06:47

## 2022-01-11 RX ADMIN — LISINOPRIL 5 MG: 5 TABLET ORAL at 17:52

## 2022-01-11 RX ADMIN — ASPIRIN 81 MG: 81 TABLET, COATED ORAL at 06:47

## 2022-01-11 ASSESSMENT — ENCOUNTER SYMPTOMS
NECK PAIN: 0
PALPITATIONS: 0
DIZZINESS: 0
FEVER: 0
WHEEZING: 0
CHEST TIGHTNESS: 0
SHORTNESS OF BREATH: 0
CHILLS: 0
HEADACHES: 0
NAUSEA: 0
VOMITING: 0
COUGH: 0

## 2022-01-11 ASSESSMENT — COGNITIVE AND FUNCTIONAL STATUS - GENERAL
SUGGESTED CMS G CODE MODIFIER MOBILITY: CH
SUGGESTED CMS G CODE MODIFIER DAILY ACTIVITY: CH
MOBILITY SCORE: 24
DAILY ACTIVITIY SCORE: 24

## 2022-01-11 ASSESSMENT — PAIN DESCRIPTION - PAIN TYPE
TYPE: ACUTE PAIN

## 2022-01-11 NOTE — PROGRESS NOTES
Report receiv ed from Rn. Assumed pt care. Pt placed on tele box, monitor room notified. Pt A&O x 4. 2 officers at bedside. Fall precautions in place, call light and belongings within reach, bed in lowest position. No signs of distress.

## 2022-01-11 NOTE — DISCHARGE PLANNING
Care Transition Team Discharge Planning    Anticipated Discharge Disposition: TBD    Action: Lsw attended Am rounds. Pt is a prisoner w/ 2 guards at bedside.    Medical team has verified the legal hold is not appropriate, and should be removed.    Lsw updated Melonie w/ Legal holds to please d/c [facility] in [facility].      Barriers to Discharge: TBD    Plan: Lsw will continue to follow, and assist w/ d/c planning.

## 2022-01-11 NOTE — PROGRESS NOTES
Cardiology Follow Up Progress Note    Date of Service  1/11/2022    Attending Physician  Dino Malik M.D.    Chief Complaint   Inferior STEMI    HPI  Three Edchilton is a 51 y.o. male admitted 1/10/2022 with HTN, DL D, remote tobacco use who was transferred from [facility] on 1/10/2022 after developing chest pain while exercising.  The pain radiated to his left back and arm with shortness of breath.    Patient underwent cardiac catheterization on 1/10/2022 with the placement of a 4.0 x 22 mm [place] KENNY placed to the mCX.    Interim Events  1/11/2022: SR  VSS  Labs reviewed      Review of Systems  Review of Systems   Constitutional: Negative for chills and fever.   Respiratory: Negative for cough, chest tightness, shortness of breath and wheezing.    Cardiovascular: Negative for chest pain, palpitations and leg swelling.   Gastrointestinal: Negative for nausea and vomiting.   Neurological: Negative for dizziness and headaches.   All other systems reviewed and are negative.      Vital signs in last 24 hours  Temp:  [35.9 °C (96.6 °F)-35.9 °C (96.7 °F)] 35.9 °C (96.7 °F)  Pulse:  [66-91] 66  Resp:  [13-28] 14  BP: (116-159)/() 122/79  SpO2:  [93 %-99 %] 96 %    Physical Exam  Physical Exam  Vitals and nursing note reviewed.   Constitutional:       Appearance: Normal appearance.   HENT:      Head: Normocephalic and atraumatic.      Mouth/Throat:      Mouth: Mucous membranes are moist.   Eyes:      Extraocular Movements: Extraocular movements intact.   Neck:      Comments: No JVD  Cardiovascular:      Rate and Rhythm: Normal rate and regular rhythm.      Pulses: Normal pulses.      Heart sounds: Normal heart sounds. No murmur heard.      Pulmonary:      Effort: Pulmonary effort is normal.      Breath sounds: Normal breath sounds.   Abdominal:      Palpations: Abdomen is soft.   Musculoskeletal:      Cervical back: Normal range of motion and neck supple.   Skin:     General: Skin is warm and  dry.   Neurological:      General: No focal deficit present.      Mental Status: He is alert and oriented to person, place, and time.   Psychiatric:         Mood and Affect: Mood normal.         Behavior: Behavior normal.         Lab Review  Lab Results   Component Value Date/Time    WBC 14.9 (H) 01/10/2022 11:41 AM    RBC 5.03 01/10/2022 11:41 AM    HEMOGLOBIN 15.1 01/10/2022 11:41 AM    HEMATOCRIT 43.3 01/10/2022 11:41 AM    MCV 86.1 01/10/2022 11:41 AM    MCH 30.0 01/10/2022 11:41 AM    MCHC 34.9 01/10/2022 11:41 AM    MPV 9.4 01/10/2022 11:41 AM      Lab Results   Component Value Date/Time    SODIUM 138 01/10/2022 11:41 AM    POTASSIUM 3.7 01/10/2022 11:41 AM    CHLORIDE 101 01/10/2022 11:41 AM    CO2 22 01/10/2022 11:41 AM    GLUCOSE 149 (H) 01/10/2022 11:41 AM    BUN 15 01/10/2022 11:41 AM    CREATININE 0.97 01/10/2022 11:41 AM      Lab Results   Component Value Date/Time    ASTSGOT 36 01/10/2022 11:41 AM    ALTSGPT 50 01/10/2022 11:41 AM     Lab Results   Component Value Date/Time    TROPONINT 13 01/10/2022 11:41 AM       No results for input(s): NTPROBNP in the last 72 hours.    Cardiac Imaging and Procedures Review  EKG:  My personal interpretation of the EKG dated 1/10/2022 is SR with ST elevations in lead II, III, and aVF with reciprocal ST depression in lead I and aVL    Echocardiogram: 1/11/2022  Pending    Cardiac Catheterization: 1/10/2022  IMPRESSIONS:  1. Inferior STEMI due to occluded circumflex  2. Successful PCI of the mid circumflex using a 4.0 x 22 mm [place] KENNY, IVUS guidance  3.  Severe LV systolic dysfunction at 30% LVEF, with apical akinesis-suggesting superimposed Takotsubo cardiomyopathy  4.  Moderate elevation LVEDP at 25 mmHg    Imaging  Chest X-Ray: 1/10/2022  No acute cardiopulmonary process      Assessment/Plan  1. Inferior STEMI   - 4.0 x 22 mm Alessandro KENNY to mCx  -Continue ASA 81 mg daily, atorvastatin 80 mg every evening, carvedilol 6.25 mg daily, lisinopril 5 mg daily, and prasugrel  10 mg daily  - Discussed dietary and lifestyle changes and the importance of taking medications on a regular basis.  Discussed right radial cath site precautions.    2. HLD  -Continue ASA 81 mg daily, atorvastatin 80 mg every evening    3. HTN  -Well-controlled  - Continue carvedilol 6.25 mg twice daily and lisinopril 5 mg daily        Thank you for allowing me to participate in the care of this patient.  I will continue to follow this patient    Please contact me with any questions.        TARYN Hull  [facility]  [phone]    No future appointments.    Please note that this dictation was created using voice recognition software. I have worked with consultants from the vendor as well as technical experts from [facility] to optimize the interface. I have made every reasonable attempt to correct obvious errors, but I expect that there are errors of grammar and possibly content I did not discover before finalizing the note.         5

## 2022-01-11 NOTE — CARE PLAN
The patient is Stable - Low risk of patient condition declining or worsening    Shift Goals  Clinical Goals: hemodynamic stability  Patient Goals: rest, comfort, food  Family Goals: NA    Progress made toward(s) clinical / shift goals:  patient educated on event, and need for medication compliance, all questions answered. Patient has no pain.   Problem: Knowledge Deficit - Standard  Goal: Patient and family/care givers will demonstrate understanding of plan of care, disease process/condition, diagnostic tests and medications  Outcome: Progressing     Problem: Pain - Standard  Goal: Alleviation of pain or a reduction in pain to the patient’s comfort goal  Outcome: Progressing

## 2022-01-11 NOTE — PROGRESS NOTES
"Hospital Medicine Daily Progress Note    Date of Service  1/11/2022    Chief Complaint  Maxx Short is a 51 y.o. male admitted 1/10/2022 with chest pain.    Hospital Course  51 y.o. male with medical history significant for hypertension, hyperlipidemia, remote tobacco use who was being transferred from [facility] 1/10/2022 with c/o chest pain.  Chest pain started acutely while the patient was walking around, the pain is radiating to left shoulder and back, associated with shortness of breath, nausea, diaphoresis and dizziness..  EMS was called, patient was received full dose aspirin and three doses of nitroglycerin with persistent chest pain.  EKG showed inferior ST elevation.  Upon arrival to the ER, cardiology was consulted and the patient was taken to Cath Lab right away.  Per cardiology, \" Successful PCI of the mid circumflex using one KENNY, IVUS guidance   Severe LV systolic dysfunction at 30% LVEF, with apical akinesis-suggesting superimposed Takotsubo cardiomyopathy. \" Pt was initiated on aspirin, prasugrel, Coreg, lisinopril, Lipitor and admitted to the IMCU.    Interval Problem Update  1/11: patient seen and evaluated in the IMCU. He is tolerating a diet. Guards are at bedside. We had a long discussion about the need for life-long medications. Echocardiogram is pending.     I have personally seen and examined the patient at bedside. I discussed the plan of care with charge RN.    Consultants/Specialty  Cardiology     Code Status  Full Code    Disposition  Patient is not medically cleared for discharge.   Anticipate discharge to shelter .  I have placed the appropriate orders for post-discharge needs.    Review of Systems  Review of Systems   Constitutional: Negative for chills and fever.   Respiratory: Negative for shortness of breath.    Cardiovascular: Negative for chest pain.   Gastrointestinal: Negative for nausea and vomiting.   Musculoskeletal: Negative for neck pain.   All other " systems reviewed and are negative.       Physical Exam  Temp:  [35.9 °C (96.6 °F)-35.9 °C (96.7 °F)] 35.9 °C (96.7 °F)  Pulse:  [66-91] 70  Resp:  [13-28] 19  BP: (105-159)/() 108/74  SpO2:  [93 %-99 %] 96 %    Physical Exam  Vitals and nursing note reviewed.   Constitutional:       General: He is not in acute distress.     Appearance: He is not toxic-appearing.   HENT:      Head: Normocephalic and atraumatic.      Mouth/Throat:      Mouth: Mucous membranes are dry.      Pharynx: Oropharynx is clear.   Eyes:      General: No scleral icterus.     Conjunctiva/sclera: Conjunctivae normal.   Neck:      Vascular: No carotid bruit.   Cardiovascular:      Rate and Rhythm: Normal rate and regular rhythm.      Heart sounds: No murmur heard.      Pulmonary:      Effort: Pulmonary effort is normal.      Breath sounds: Normal breath sounds.   Abdominal:      General: There is no distension.      Tenderness: There is no abdominal tenderness.   Musculoskeletal:      Cervical back: Normal range of motion and neck supple.      Right lower leg: No edema.      Left lower leg: No edema.      Comments: No hematoma of the cath site   Neurological:      General: No focal deficit present.      Mental Status: He is alert and oriented to person, place, and time.   Psychiatric:         Mood and Affect: Mood normal.         Behavior: Behavior normal.         Thought Content: Thought content normal.         Judgment: Judgment normal.         Fluids    Intake/Output Summary (Last 24 hours) at 1/11/2022 0814  Last data filed at 1/11/2022 0200  Gross per 24 hour   Intake 1614.58 ml   Output 1125 ml   Net 489.58 ml       Laboratory  Recent Labs     01/10/22  1141 01/11/22  0639   WBC 14.9* 11.6*   RBC 5.03 4.73   HEMOGLOBIN 15.1 14.2   HEMATOCRIT 43.3 41.2*   MCV 86.1 87.1   MCH 30.0 30.0   MCHC 34.9 34.5   RDW 39.3 40.5   PLATELETCT 386 324   MPV 9.4 9.1     Recent Labs     01/10/22  1141 01/11/22  0639   SODIUM 138 138   POTASSIUM 3.7 3.8  "  CHLORIDE 101 102   CO2 22 24   GLUCOSE 149* 102*   BUN 15 14   CREATININE 0.97 1.00   CALCIUM 9.5 8.9                   Imaging  DX-CHEST-LIMITED (1 VIEW)   Final Result      No acute cardiopulmonary abnormality.      CL-LEFT HEART CATHETERIZATION WITH POSSIBLE INTERVENTION    (Results Pending)   EC-ECHOCARDIOGRAM COMPLETE W/O CONT    (Results Pending)        Assessment/Plan  * STEMI (ST elevation myocardial infarction) (HCC)  Assessment & Plan  With emergent cardiac catheterization on 1/10:   \" Successful PCI of the mid circumflex using one KENNY, IVUS guidance   Severe LV systolic dysfunction at 30% LVEF, with apical akinesis-suggesting superimposed Takotsubo cardiomyopathy. \"  Pt was initiated on aspirin, prasugrel, Coreg, lisinopril, Lipitor.  Cardiology following.   Continuous telemetry monitoring to evaluate for post-perfusion arrhythmias  Lipid panel ordered  Echocardiogram pending        Systolic heart failure (HCC)  Assessment & Plan  Admitted for STEMI   S/p left heart cath   Found to have \"Severe LV systolic dysfunction at 30% LVEF, with apical akinesis-suggesting superimposed Takotsubo cardiomyopathy. \"  Pt was initiated on aspirin, prasugrel, Coreg, lisinopril, Lipitor.  Cardiology following.   Echo pending        Hypertension  Assessment & Plan  S/p cardiac cath ,  Successful PCI of the mid circumflex using one KENNY  Pt was initiated on  Coreg, lisinopril.         Hyperlipidemia  Assessment & Plan  Lipid panel   Lipitor        VTE prophylaxis: enoxaparin ppx    I have performed a physical exam and reviewed and updated ROS and Plan today (1/11/2022). In review of yesterday's note (1/10/2022), there are no changes except as documented above.      "

## 2022-01-12 ENCOUNTER — PHARMACY VISIT (OUTPATIENT)
Dept: PHARMACY | Facility: MEDICAL CENTER | Age: 52
End: 2022-01-12
Payer: COMMERCIAL

## 2022-01-12 VITALS
TEMPERATURE: 97.2 F | DIASTOLIC BLOOD PRESSURE: 71 MMHG | BODY MASS INDEX: 32.49 KG/M2 | SYSTOLIC BLOOD PRESSURE: 115 MMHG | WEIGHT: 219.36 LBS | HEIGHT: 69 IN | HEART RATE: 84 BPM | RESPIRATION RATE: 16 BRPM | OXYGEN SATURATION: 92 %

## 2022-01-12 LAB
CHOLEST SERPL-MCNC: 126 MG/DL (ref 100–199)
HDLC SERPL-MCNC: 36 MG/DL
LDLC SERPL CALC-MCNC: 68 MG/DL
TRIGL SERPL-MCNC: 111 MG/DL (ref 0–149)

## 2022-01-12 PROCEDURE — 99239 HOSP IP/OBS DSCHRG MGMT >30: CPT | Performed by: HOSPITALIST

## 2022-01-12 PROCEDURE — 80061 LIPID PANEL: CPT

## 2022-01-12 PROCEDURE — 700102 HCHG RX REV CODE 250 W/ 637 OVERRIDE(OP): Performed by: INTERNAL MEDICINE

## 2022-01-12 PROCEDURE — A9270 NON-COVERED ITEM OR SERVICE: HCPCS | Performed by: INTERNAL MEDICINE

## 2022-01-12 PROCEDURE — RXMED WILLOW AMBULATORY MEDICATION CHARGE: Performed by: HOSPITALIST

## 2022-01-12 PROCEDURE — 700102 HCHG RX REV CODE 250 W/ 637 OVERRIDE(OP): Performed by: HOSPITALIST

## 2022-01-12 PROCEDURE — 36415 COLL VENOUS BLD VENIPUNCTURE: CPT

## 2022-01-12 PROCEDURE — 99232 SBSQ HOSP IP/OBS MODERATE 35: CPT | Mod: FS | Performed by: NURSE PRACTITIONER

## 2022-01-12 PROCEDURE — A9270 NON-COVERED ITEM OR SERVICE: HCPCS | Performed by: HOSPITALIST

## 2022-01-12 RX ORDER — PRASUGREL 10 MG/1
10 TABLET, FILM COATED ORAL DAILY
Qty: 3 TABLET | Refills: 0 | Status: SHIPPED | OUTPATIENT
Start: 2022-01-13 | End: 2022-01-12

## 2022-01-12 RX ORDER — LISINOPRIL 2.5 MG/1
2.5 TABLET ORAL 2 TIMES DAILY
Qty: 30 TABLET | Refills: 0 | Status: SHIPPED
Start: 2022-01-12 | End: 2022-05-19

## 2022-01-12 RX ORDER — ASPIRIN 81 MG/1
81 TABLET ORAL DAILY
Qty: 30 TABLET | Status: SHIPPED
Start: 2022-01-13

## 2022-01-12 RX ORDER — PRASUGREL 10 MG/1
10 TABLET, FILM COATED ORAL DAILY
Qty: 30 TABLET | Refills: 0 | Status: SHIPPED
Start: 2022-01-13 | End: 2022-01-12

## 2022-01-12 RX ORDER — PRASUGREL 10 MG/1
10 TABLET, FILM COATED ORAL DAILY
Qty: 30 TABLET | Refills: 0 | Status: SHIPPED | OUTPATIENT
Start: 2022-01-13

## 2022-01-12 RX ORDER — CARVEDILOL 6.25 MG/1
6.25 TABLET ORAL 2 TIMES DAILY WITH MEALS
Qty: 60 TABLET | Refills: 0 | Status: SHIPPED
Start: 2022-01-12 | End: 2022-05-19

## 2022-01-12 RX ORDER — ATORVASTATIN CALCIUM 80 MG/1
40 TABLET, FILM COATED ORAL EVERY EVENING
Qty: 30 TABLET | Status: SHIPPED
Start: 2022-01-12 | End: 2022-01-28

## 2022-01-12 RX ORDER — LISINOPRIL 5 MG/1
2.5 TABLET ORAL TWICE DAILY
Status: DISCONTINUED | OUTPATIENT
Start: 2022-01-12 | End: 2022-01-12 | Stop reason: HOSPADM

## 2022-01-12 RX ADMIN — ASPIRIN 81 MG: 81 TABLET, COATED ORAL at 05:32

## 2022-01-12 RX ADMIN — SENNOSIDES AND DOCUSATE SODIUM 2 TABLET: 50; 8.6 TABLET ORAL at 05:31

## 2022-01-12 RX ADMIN — CARVEDILOL 6.25 MG: 6.25 TABLET, FILM COATED ORAL at 07:50

## 2022-01-12 RX ADMIN — PRASUGREL 10 MG: 10 TABLET, FILM COATED ORAL at 05:32

## 2022-01-12 RX ADMIN — LISINOPRIL 5 MG: 5 TABLET ORAL at 05:31

## 2022-01-12 NOTE — PROGRESS NOTES
Patient to discharge via escort with guards back to correctional facility. Awaiting transport.  IV and tele box removed, all belongings gathered and sent home with patient. Patient a&ox4, educated on medications and provided Effient to guards on discharge. Verbalized understanding and denied any questions or additional needs at this time.     Report given to Vaishali BLACK at correctional facility and updated her to give Atorvastatin 40mg once daily in the evening per cardiology Karolyn Doll.

## 2022-01-12 NOTE — CARE PLAN
The patient is Stable - Low risk of patient condition declining or worsening    Shift Goals  Clinical Goals: no chest pain  Patient Goals: rest, comfort, food  Family Goals: NA    Progress made toward(s) clinical / shift goals:  pt has not had chest pain      Patient is not progressing towards the following goals:      Problem: Knowledge Deficit - Standard  Goal: Patient and family/care givers will demonstrate understanding of plan of care, disease process/condition, diagnostic tests and medications  Outcome: Progressing     Problem: Pain - Standard  Goal: Alleviation of pain or a reduction in pain to the patient’s comfort goal  Outcome: Progressing

## 2022-01-12 NOTE — DISCHARGE SUMMARY
"Discharge Summary    CHIEF COMPLAINT ON ADMISSION  Chest Pain    Reason for Admission  STEMI     Admission Date  1/10/2022    CODE STATUS  Full Code    HPI & HOSPITAL COURSE  For full details of admission please see admission consultation of Dr. Johnny Briscoe dated 1/10/2022, briefly, \"[Alias] patient is a 51 y.o. male with a past medical history of hypertension, dyslipidemia and remote tobacco use who has been transferred from [facility] on 1/10/2022 with the development of chest pain well exercising, working out around 930 this morning radiating to his left back and arm with shortness of breath.  EMS was contacted.  The patient received aspirin and 3 nitroglycerin with persistent pain.  EKG showed inferior ST elevation.     The patient continues to have chest pain.  Vital signs are stable.  EKG continues to show inferior ST elevation with high lateral reciprocal changes.  The patient has no prior history of heart disease.  He denies any current drug use.  No family history of premature heart disease.  Currently on HCTZ and atorvastatin.\"    Patient was found to have an acute ST segment elevation MI, was taken to the catheterization lab, had a drug-eluting stent placed, full procedure report below.  He was subsequently treated with dual antiplatelet therapy, afterload reduction with low-dose lisinopril, and beta-blockade.  He tolerated these interventions well.  Follow-up echocardiogram per below.  Statin dose adjusted per cards.  LDL <70.  Patient cleared for discharge on 1/12/2022.  He is to have follow-up as outlined below.  Face-to-face encounter held on the day of discharge.    Therefore, he is discharged in good and stable condition to court or custody of law enforcement.    The patient met 2-midnight criteria for an inpatient stay at the time of discharge.    Discharge Date  01/12/22      FOLLOW UP ITEMS POST DISCHARGE  CAD, STEMI, s/p KENNY/PCI to mid Cx   Guideline directed medical " therapy to include   - Continue DAPT x 1 year  - Continue aspirin 81 mg daily  - Continue Effient 10mg daily   - Continue carvedilol 6.25mg bid  - Statin, atorva 80mg  - Wrist precautions discussed  - If EF 45%, lisinopril 2.5mg daily  - Unable to participate in Intensive Cardiac rehab referral, post MI care and exercise discussed and provided in AVS  - Discussed worsening symptoms of chest pain, patient to go the emergency room  -Will have 1 week F/U telemedicine, Echo in 1-2 months to re-evaluate EF    DISCHARGE DIAGNOSES  Principal Problem:    STEMI (ST elevation myocardial infarction) (HCC) POA: Unknown  Active Problems:    Hyperlipidemia POA: Unknown    Hypertension POA: Unknown    Systolic heart failure (HCC) POA: Unknown  Resolved Problems:    * No resolved hospital problems. *      FOLLOW UP  No future appointments.  [facility]  Please go directly to the [facility] facility. You will need medications every day going forward for your heart attack and for your heart failure. You will also need regular physician check ups.  Go on 1/14/2022      [facility]   [address]  [facility] [id number]  [phone]  In 1 week  please call if appt. not already scheduled for you      MEDICATIONS ON DISCHARGE     Medication List      START taking these medications      Instructions   aspirin 81 MG EC tablet  Start taking on: January 13, 2022   Take 1 Tablet by mouth every day.  Dose: 81 mg     carvedilol 6.25 MG Tabs  Commonly known as: COREG   Take 1 Tablet by mouth 2 times a day with meals.  Dose: 6.25 mg     lisinopril 2.5 MG Tabs  Commonly known as: PRINIVIL   Take 1 Tablet by mouth 2 times a day.  Dose: 2.5 mg     prasugrel 10 MG Tabs  Start taking on: January 13, 2022  Commonly known as: EFFIENT   Take 1 Tablet by mouth every day.  Dose: 10 mg        CHANGE how you take these medications      Instructions   * atorvastatin 20 MG Tabs  What  changed: Another medication with the same name was added. Make sure you understand how and when to take each.  Commonly known as: LIPITOR   Take 20 mg by mouth every evening.  Dose: 20 mg     * atorvastatin 80 MG tablet  What changed: You were already taking a medication with the same name, and this prescription was added. Make sure you understand how and when to take each.  Commonly known as: LIPITOR   Take 0.5 Tablets by mouth every evening.  Dose: 40 mg         * This list has 2 medication(s) that are the same as other medications prescribed for you. Read the directions carefully, and ask your doctor or other care provider to review them with you.            CONTINUE taking these medications      Instructions   calcium polycarbophil 625 MG Tabs  Commonly known as: FIBERCON   Take 1,250 mg by mouth 1 time a day as needed (Constipation). 2 tablets = 1,250 mg.  Dose: 1,250 mg     capsaicin 0.025 % cream  Commonly known as: Zostrix   Apply 1 Application topically 3 times a day as needed (Muscle Spasms).  Dose: 1 Application     ibuprofen 600 MG Tabs  Commonly known as: MOTRIN   Take 600 mg by mouth 3 times a day as needed for Moderate Pain.  Dose: 600 mg     triamcinolone acetonide 0.025 % Crea  Commonly known as: KENALOG   Apply 1 Application topically 2 times a day as needed (Itching or Dermatitis).  Dose: 1 Application            Allergies  Allergies   Allergen Reactions   • Penicillins Unspecified     Unknown reaction from childhood       DIET  Orders Placed This Encounter   Procedures   • Diet Order Diet: Cardiac     Standing Status:   Standing     Number of Occurrences:   1     Order Specific Question:   Diet:     Answer:   Cardiac [6]       ACTIVITY  As tolerated.  Weight bearing as tolerated    CONSULTATIONS  Cardiology  [facility] Medicine  Critical Care Medicine    PROCEDURES  PROCEDURE PHYSICIAN: Dino Malik MD, [facility], [facility]  ASSISTANT: None     IMPRESSIONS:  1. Inferior STEMI due to occluded  "circumflex  2. Successful PCI of the mid circumflex using one KENNY, IVUS guidance  3.  Severe LV systolic dysfunction at 30% LVEF, with apical akinesis-suggesting superimposed Takotsubo cardiomyopathy  4.  Moderate elevation LVEDP at 25 mmHg     Recommendations:  Usual post PCI care  Further recommendations per the rounding team  Angiomax for 4 hours.     Pre-procedure diagnosis: STEMI  Post-procedure diagnosis: Same     Procedure performed  Selective coronary angiography  Left heart catheterization  Percutaneous coronary intervention - Stent Placement (Circumflex)  Intravascular Ultrasound (Circumflex)     Conscious sedation was supervised by myself and administered by trained personnel using fentanyl and versed between 1220 and 1248. The patient tolerated sedation without complication.      Procedure Description  1. Access: 5/6 [place] right radial artery Micropuncture technique was utilized following local anesthesia with lidocaine.  A radial cocktail containing verapamil and saline was administered in the radial artery sheath     2. Diagnostic description: The catheter was passed to the central circulation with the aide of J tipped 0.35\" wire. A 5F TIG 4.0, 6F Pigtail and 6F EBU 3.5 were used to inject the coronary circulation and inject the left ventricle during invasive hemodynamic monitoring.      3. Description of Intervention: After confirming therapeutic anticoagulation intervention proceeded. A 6F EBU 3.5 guiding catheter was used. The lesion in the circumflex was crossed with a 0.014\" [place] wire.  The lesion was predilated with a 4.0 x 12 mm NC balloon.  I then deployed a 4.0 x 22 mm [place] drug-eluting stent at nominal atmospheres.  I then performed IVUS after administration of intracoronary nitroglycerin which demonstrated excellent stent sizing as well as expansion throughout.  Subsequent angiogram showed excellent result.     4. Closing: At completion of the procedure the relevant equipment was " removed from the body and hemostasis achieved by Radial band     Findings   Hemodynamics:   Aorta: 162/110 mmHg  LVEDP: 25 mmHg  No significant pullback gradient across the aortic valve     Coronary Anatomy              Left Main: Normal              LAD: Mid 25% stenosis, distal 25% stenosis.              LCx: 100% occlusion after the origin of the first, small obtuse marginal vessel.              RCA: Dominant, 25% proximal stenosis.  The PDA and posterior lateral branches are normal.                Left Ventriculography:   LVEF= 30%. Akinesis in the Mid and apical anterior wall and apical inferior wall     Results of intervention to the circumflex:  Pre: 100% stenosis and SANIYA 0 flow  Post: 0% residual stenosis and SANIYA III flow. No dissection or distal embolization.     Technical Factors  1. Complications: None  2. Estimated Blood Loss: <50 cc  3. Specimens: None  4. Contrast Volume: 100 ml  5. Medications: Radial cocktail (Verapamil 2.5 mg, Nitroglycerin 100 mcg) Heparin 5000 Units Bivalirudin Prasugrel 60 mg Intracoronary nitroglycerin -Angiomax chosen as the anticoagulant after 10 units/kg of heparin resulted in ACT of only 160 seconds.  6. Radiation (Air Kerma): 289 mGy       RADIOLOGY  EC-ECHOCARDIOGRAM COMPLETE W/O CONT   Final Result      DX-CHEST-LIMITED (1 VIEW)   Final Result      No acute cardiopulmonary abnormality.      CL-LEFT HEART CATHETERIZATION WITH POSSIBLE INTERVENTION    (Results Pending)   Echocardiography Laboratory     CONCLUSIONS  The left ventricular ejection fraction is visually estimated to be 45%.  Left ventricular systolic function is mildly reduced.      Anterolateral, lateral and inferolateral hypokinsis.        LABORATORY  Lab Results   Component Value Date    SODIUM 138 01/11/2022    POTASSIUM 3.8 01/11/2022    CHLORIDE 102 01/11/2022    CO2 24 01/11/2022    GLUCOSE 102 (H) 01/11/2022    BUN 14 01/11/2022    CREATININE 1.00 01/11/2022        Lab Results   Component Value Date     WBC 11.6 (H) 01/11/2022    HEMOGLOBIN 14.2 01/11/2022    HEMATOCRIT 41.2 (L) 01/11/2022    PLATELETCT 324 01/11/2022        Total time of the discharge process exceeds 33 minutes.

## 2022-01-12 NOTE — DISCHARGE PLANNING
Anticipated Discharge Disposition: Back to [facility]    Action: SOFIA BURLESON spoke with Leslie BLACK at [facility].  They will need the DC summary before they can arrange transport.  They also need a copy of the EKG image since they are unable to access it in Epic.  RN CM sent DC summary and EKG image via fax at [phone] and via email to [email or web address].    Barriers to Discharge: Transport     Plan: Case coordination to continue to follow up with medical team to discuss discharge barriers.

## 2022-01-12 NOTE — PROGRESS NOTES
Report received at bedside from NOC RN, pt care assumed, tele box on. Pt aaox4, no signs of distress noted at this time. Patient resting comfortably in bed. POC discussed with pt and verbalizes no questions.  Pt denies any additional needs at this time. Bed in lowest position, pt educated on fall risk and verbalized understanding, call light within reach, guards at bedside.

## 2022-01-12 NOTE — PROGRESS NOTES
Received bedside report from SOFIA Garza, pt care assumed. No signs of acute distress noted at this time. Bed in lowest position. Pt educated on fall risk and use of call light.

## 2022-01-12 NOTE — PROGRESS NOTES
[facility] Follow-up Note    Date of Service:    1/12/2022      Name:   Maxx Short     YOB: 1970  Age:   51 y.o.  male   MRN:   [id number]      Reason for cardiology consult: Inferior Stemi    Attending Provider: Dr. Flynn    HPI:  Maxx Short is a 51 y.o. male admitted 1/10/2022 with HTN, DLD, remote tobacco abuse who was transferred from [facility] on 1/10/2022 after developing chest pain while exercising.  The pain radiated to his left back and arm with shortness of breath. EKG showed inferior ST elevation. He received 3 81mg aspirin prior.     Patient underwent cardiac catheterization on 1/10/2022 with the placement of a 4.0 x 22 mm [place] KENNY placed to the mid Cx.    Interim Events:  - Personal Telemetry interpretation: SR 80's  - Overnight events: n/a  - Vitals: SBP 's, room air   - Labs reviewed: WNL    ROS  Constitutional:  Positive fatigue.  Respiratory:  Denies shortness of breath, no cough.  Cardiovascular: denies chest pain.  no lower extremity edema.  Denies orthopnea or PND.  : no polyuria, no dysuria.  GI:  Denies nausea/vomiting.  No abdominal distention. Mild constipation, no substantial BM in 3 days  Neuro:  Denies dizziness, syncope.  Hem/lymph: Denies easy bleeding/bruising.      All other review of systems reviewed and negative.    Past medical, surgical, social, and family history reviewed and unchanged from admission except as noted in HPI.    Medications: Reviewed in MAR  Current Facility-Administered Medications   Medication Dose Frequency Provider Last Rate Last Admin   • lisinopril (PRINIVIL) tablet 2.5 mg  2.5 mg TWICE DAILY Johnny Briscoe M.D.       • bisacodyl (DULCOLAX) suppository 10 mg  10 mg DAILY AT 1800 Issa Joyce M.D.       • senna-docusate (PERICOLACE or SENOKOT S) 8.6-50 MG per tablet 2 Tablet  2 Tablet BID Issa Joyce M.D.   2 Tablet at 01/12/22 0531   • polyethylene glycol/lytes (MIRALAX) PACKET 1  "Packet  1 Packet QDAY PRN Issa Joyce M.D.   1 Packet at 01/11/22 1751   • magnesium hydroxide (MILK OF MAGNESIA) suspension 30 mL  30 mL QDAY PRN Issa Joyce M.D.       • aspirin EC (ECOTRIN) tablet 81 mg  81 mg DAILY Dino Malik M.D.   81 mg at 01/12/22 0532   • prasugrel (EFFIENT) tablet 10 mg  10 mg DAILY Dino Malik M.D.   10 mg at 01/12/22 0532   • carvedilol (COREG) tablet 6.25 mg  6.25 mg BID WITH MEALS Dino Malik M.D.   6.25 mg at 01/11/22 1751   • atorvastatin (LIPITOR) tablet 80 mg  80 mg Q EVENING Dino Malik M.D.   80 mg at 01/11/22 1751   • acetaminophen (Tylenol) tablet 650 mg  650 mg Q6HRS PRN Sherine Cummins M.D.   650 mg at 01/11/22 1751   Last reviewed on 1/10/2022  8:11 PM by Maliha Jensen R.N.    Allergies   Allergen Reactions   • Penicillins Unspecified     Unknown reaction from childhood       Physical Exam  Body mass index is 32.39 kg/m². /71   Pulse 84   Temp 36.2 °C (97.2 °F) (Temporal)   Resp 16   Ht 1.753 m (5' 9\")   Wt 99.5 kg (219 lb 5.7 oz)   SpO2 92%    Vitals:    01/11/22 1933 01/11/22 2309 01/12/22 0440 01/12/22 0740   BP: 117/79 (!) 94/59 118/68 115/71   Pulse: 89 82 77 84   Resp: 16 18 16 16   Temp: 36.8 °C (98.2 °F) 37.1 °C (98.8 °F) 36.6 °C (97.9 °F) 36.2 °C (97.2 °F)   TempSrc: Temporal Temporal Temporal Temporal   SpO2: 92% 96% 91% 92%   Weight:       Height:        Oxygen Therapy:  Pulse Oximetry: 92 %, O2 (LPM): 0, O2 Delivery Device: None - Room Air    General: no acute distress, well appearing  Neck: no JVD, no bruits  Lungs: CTAB, normal effort. no wheezing, rales, or rhonchi  Heart: RRR, normal S1 /S2 no murmur, no rub  EXT: no lower extremity edema, 2+ radial pulses. 2+ pedal pulses.   Abdomen: soft, non tender, non distended  Neurological: No focal deficits, no facial asymmetry.  Normal speech  Psychiatric: Appropriate affect, alert and oriented x 3  Skin: Warm and dry extremities, no rashes    Labs (personally reviewed):     Lab " Results   Component Value Date/Time    SODIUM 138 01/11/2022 06:39 AM    POTASSIUM 3.8 01/11/2022 06:39 AM    CHLORIDE 102 01/11/2022 06:39 AM    CO2 24 01/11/2022 06:39 AM    GLUCOSE 102 (H) 01/11/2022 06:39 AM    BUN 14 01/11/2022 06:39 AM    CREATININE 1.00 01/11/2022 06:39 AM     Lab Results   Component Value Date/Time    ALKPHOSPHAT 80 01/10/2022 11:41 AM    ASTSGOT 36 01/10/2022 11:41 AM    ALTSGPT 50 01/10/2022 11:41 AM    TBILIRUBIN 0.6 01/10/2022 11:41 AM      Lab Results   Component Value Date/Time    CHOLSTRLTOT 126 01/12/2022 02:39 AM    LDL 68 01/12/2022 02:39 AM    HDL 36 (A) 01/12/2022 02:39 AM    TRIGLYCERIDE 111 01/12/2022 02:39 AM     Cardiac Imaging and Procedures Review:      EKG 1/10/22 :  My personal interpretation of the EKG dated 1/10/2022 is SR with ST elevations1 in lead II, III, and aVF with reciprocal ST depression in lead I and aVL     Echocardiogram: 1/11/2022   The left ventricular ejection fraction is visually estimated to be 45%.  Left ventricular systolic function is mildly reduced.  Anterolateral, lateral and inferolateral hypokinsis.    Cardiac Catheterization: 1/10/2022  IMPRESSIONS:  1. Inferior STEMI due to occluded circumflex  2. Successful PCI of the mid circumflex using a 4.0 x 22 mm [place] KENNY, IVUS guidance  3.  Severe LV systolic dysfunction at 30% LVEF, with apical akinesis-suggesting superimposed Takotsubo cardiomyopathy  4.  Moderate elevation LVEDP at 25 mmHg    Assessment and Medical Decision Making:    CAD, STEMI, s/p KENNY/PCI to mid Cx   Guideline directed medical therapy to include   - Continue DAPT x 1 year  - Continue aspirin 81 mg daily  - Continue Effient 10mg daily   - Continue carvedilol 6.25mg bid  - Statin, atorva 80mg  - Wrist precautions discussed  - If EF 45%, lisinopril 2.5mg daily  - Unable to participate in Intensive Cardiac rehab referral, post MI care and exercise discussed and provided in AVS  - Discussed worsening symptoms of chest pain, patient to  go the emergency room  -Will have 1 week F/U telemedicine, Echo in 1-2 months to re-evaluate EF    Thank you for allowing me to participate in this patients care.  Please contact me with any questions or concerns. Ok to discharge from cardiology's standpoint.    Please see Dr. Briscoe's attestation for additions and further recommendations.     PLEASE NOTE: Some of this dictation was created using voice recognition software. I have made every reasonable attempt to correct obvious errors, but I expect that there are errors of grammar and possibly content that I did not discover before finalizing the note.     RAMON De Luna.   [facility]  [phone]

## 2022-01-12 NOTE — DISCHARGE INSTRUCTIONS
Discharge Instructions    Discharged to other by medical transportation with escort. Discharged via wheelchair, hospital escort: Yes.  Special equipment needed: Not Applicable    Be sure to schedule a follow-up appointment with your primary care doctor or any specialists as instructed.     Discharge Plan:   Diet Plan: Discussed  Activity Level: Discussed  Confirmed Follow up Appointment: Appointment Scheduled  Confirmed Symptoms Management: Discussed  Medication Reconciliation Updated: Yes  Influenza Vaccine Indication: Patient Refuses    I understand that a diet low in cholesterol, fat, and sodium is recommended for good health. Unless I have been given specific instructions below for another diet, I accept this instruction as my diet prescription.       Special Instructions: Diagnosis:  Acute Coronary Syndrome (ACS) is a diagnosis that encompasses cardiac-related chest pain and heart attack. ACS occurs when the blood flow to the heart muscle is severely reduced or cut off completely due to a slow process called atherosclerosis.  Atherosclerosis is a disease in which the coronary arteries become narrow from a buildup of fat, cholesterol, and other substances that combine to form plaque. If the plaque breaks, a blood clot will form and block the blood flow to the heart muscle. This lack of blood flow can cause damage or death to the heart muscle which is called a heart attack or Myocardial Infarction (MI). There are two different types of MIs:  ST Elevation Myocardial Infarction or STEMI (the most severe type of heart attack) and Non-ST Elevation Myocardial Infarction or NSTEMI.    Treatment Plan:  · Cardiac Diet  - Low fat, low salt, low cholesterol   · Cardiac Rehab  - Your doctor has ordered you a referral to [facility].  Call [phone] to schedule an appointment.  · Attend my follow-up appointment with my Cardiologist.  · Take my medications as prescribed by my doctor  · Exercise daily  · Quit Smoking, Lower my  bad cholesterol and raise my good cholesterol, lower my blood pressure and Reduce stress    Medications:  Certain medications are used to treat ACS.  Remember to always take medications as prescribed and never stop talking medications unless told by your doctor.    You have been prescribed the following medicatons:    Aspirin - Aspirin is used as a blood thinning medication and you will require this medication indefinitely.  Anti-platelet/blood thinner - Your Anti-platelet/Blood thinning medication is called prasugrel (Effient), and is used in combination with aspirin to prevent clots from forming in your heart and/or around your stent.  Your doctor will determine how long you need to be on this medicine.  Beta-Blocker - Beta-Blocker carvedilol (Coreg) is used to lower blood pressure and heart rate, and/or helps your heart heal after a heart attack.  Statin - Statin atorvastatin (Lipitor) is used to lower cholesterol.  No     · Is patient discharged on Warfarin / Coumadin?       HF Patient Discharge Instructions  · Monitor your weight daily, and maintain a weight chart, to track your weight changes.   · Activity as tolerated, unless your Doctor has ordered otherwise. Other activity order: As tolerated.  · Follow a low fat, low cholesterol, low salt diet unless instructed otherwise by your Doctor. Read the labels on the back of food products and track your intake of fat, cholesterol and salt.   · Fluid Restriction No. If a Fluid Restriction has been ordered by your Doctor, measure fluids with a measuring cup to ensure that you are not exceeding the restriction.   · No smoking.  · Oxygen No. If your Doctor has ordered that you wear Oxygen at home, it is important to wear it as ordered.  · Did you receive an explanation from staff on the importance of taking each of your medications and why it is necessary to keep taking them unless your doctor says to stop? Yes  · Were all of your questions answered about how to  manage your heart failure and what to do if you have increased signs and symptoms after you go home? Yes  · Do you feel like your heart failure care team involved you in the care treatment plan and allowed you to make decisions regarding your care while in the hospital and addressed any discharge needs you might have? Yes    See the educational handout provided at discharge for more information on monitoring your daily weight, activity and diet. This also explains more about Heart Failure, symptoms of a flare-up and some of the tests that you have undergone.     Warning Signs of a Flare-Up include:  · Swelling in the ankles or lower legs.  · Shortness of breath, while at rest, or while doing normal activities.   · Shortness of breath at night when in bed, or coughing in bed.   · Requiring more pillows to sleep at night, or needing to sit up at night to sleep.  · Feeling weak, dizzy or fatigued.     When to call your Doctor:  · Call [facility] seven days a week from 8:00 a.m. to 8:00 p.m. for medical questions [phone].  · Call your Primary Care Physician or Cardiologist if:   1. You experience any pain radiating to your jaw or neck.  2. You have any difficulty breathing.  3. You experience weight gain of 3 lbs in a day or 5 lbs in a week.   4. You feel any palpitations or irregular heartbeats.  5. You become dizzy or lose consciousness.   If you have had an angiogram or had a pacemaker or AICD placed, and experience:  1. Bleeding, drainage or swelling at the surgical / puncture site.  2. Fever greater than 100.0 F  3. Shock from internal defibrillator.  4. Cool and / or numb extremities.        Depression / Suicide Risk    As you are discharged from this [facility], it is important to learn how to keep safe from harming yourself.    Recognize the warning signs:  · Abrupt changes in personality, positive or negative- including increase in energy   · Giving away possessions  · Change in  eating patterns- significant weight changes-  positive or negative  · Change in sleeping patterns- unable to sleep or sleeping all the time   · Unwillingness or inability to communicate  · Depression  · Unusual sadness, discouragement and loneliness  · Talk of wanting to die  · Neglect of personal appearance   · Rebelliousness- reckless behavior  · Withdrawal from people/activities they love  · Confusion- inability to concentrate     If you or a loved one observes any of these behaviors or has concerns about self-harm, here's what you can do:  · Talk about it- your feelings and reasons for harming yourself  · Remove any means that you might use to hurt yourself (examples: pills, rope, extension cords, firearm)  · Get professional help from the community (Mental Health, Substance Abuse, psychological counseling)  · Do not be alone:Call your Safe Contact- someone whom you trust who will be there for you.  · Call your local CRISIS HOTLINE 874-5784 or 813-043-5119  · Call your local Children's Mobile Crisis Response Team [place] (617) 523-4468 or wwwSupersolid  · Call the toll free National Suicide Prevention Hotlines   · National Suicide Prevention Lifeline 681-807-ZTNO (9195)  · National Hope Line Network 800-SUICIDE (948-7323)        Exercise Guidelines During Cardiac Rehabilitation  (start 30 days after heart attack, can walk and do mild aerobic exercise in the meantime)  When you are recovering from a heart condition, such as from heart surgery, heart attack, or heart failure, it is important to have heart-healthy habits, including exercise routines. Discuss an appropriate exercise program with your heart specialist (cardiologist) and rehabilitation therapist.  It is important to design a program that is safe and effective for you. The program should meet your specific abilities and needs. Walking, biking, jogging, and swimming are all good aerobic activities. These take light to moderate effort.  Adding some light resistance training is also important. Even simple lifestyle changes can help. These lifestyle changes may include parking farther from the store or taking the stairs instead of the elevator.  At first, you may begin exercising under supervision, such as at a hospital or clinic. Over time, you may begin exercising at home, with your health care provider's approval.  Types of exercise  Aerobic exercise  During cardiac rehabilitation, it is important to do aerobic activities. Aerobic exercise keeps joints and muscles moving. It involves large muscle groups. It is also rhythmic and must be done for a longer period of time. Doing these exercises improves circulation and endurance. Examples of aerobic exercise include:  · Swimming.  · Walking.  · Hiking.  · Jogging.  · Cross-country skiing.  · Biking.  · Dancing.    Static exercise  Static exercise (isometric exercise) uses muscles at high intensities without moving the joints. Some examples of static exercise include pushing against a heavy couch that does not move, doing a wall sit, or holding a plank position. Static exercise improves strength but also quickly increases blood pressure. Follow these guidelines:  · If you have circulation problems or high blood pressure, talk with your health care provider before starting any static exercise routines. Do not do static exercises if your health care provider tells you not to.  · Do not hold your breath while doing static exercises. Holding your breath during static exercises can raise your blood pressure to a dangerously high level.    Weight-resistance exercise  Weight-resistance exercises are another important part of rehabilitation. These exercises strengthen your muscles by making them work against resistance. Resistance exercises may help you return to activities of daily living sooner and improve your quality of life. They also help reduce cardiac risk factors. Examples of weight-resistance  "exercise include using:  · Free weights.  · Weight-lifting machines.  · Large, specially designed rubber bands.  You will usually do weight-resistance exercises 2 times a week with a 2-day rest period between workouts.  Stretching  Stretching before you exercise warms up your muscles and prevents injury. Stretching also improves your flexibility, balance, coordination, and range of motion. Follow these guidelines:  · Stretch both before and after exercising.  · Do not force a muscle or joint into a painful angle. Stretching should be a relaxing part of your exercise routine.  · Once you feel resistance in your muscle, hold the stretch for a few seconds. Make sure you keep breathing while you hold the stretch.  · Go slowly when doing all stretches.  Setting a pace  · Choose a pace that is comfortable for you.  ? You should be able to talk while exercising. If you are short of breath or unable to speak while you exercise, slow down.  ? If you are able to sing while exercising, you are not exercising hard enough.  · Keep track of how hard you are working as you exercise (exertion level). Your rehabilitation therapist can teach you to use a mental scale to measure your level of exertion (perceived exertion). Using a mental scale, you will think about your exertion level and rate it in a range from 6 to 20.  ? A rating of 6 to 9. This means that you are doing \"very light\" exercise and are not exerting yourself enough. For a healthy person, this may be walking at a slow pace.  ? A rating of 11 to 15. This is exercise that is \"somewhat hard.\" For a healthy exercise session, you should aim for an exertion rate that is within this range.  ? A rating of 16 to 17. This is considered \"very hard\" or strenuous. For a healthy person, exercise at this rating may start to feel heavy and difficult.  ? A rating of 19 to 20. This means that you are working \"extremely hard.\" For most people, these numbers represent the hardest you've " ever worked to exercise.  · Your health care provider or cardiac rehabilitation specialist may also recommend that you wear a heart rate monitor while you exercise. This will help you keep track of your heart rate zones and how hard your heart is working.  Frequency  As you are recovering, it is important to start exercising slowly and to gradually work up to your goal. Work with your health care provider to set up an exercise routine that works for you. Generally, cardiac rehabilitation exercise should include:  · 40 minutes of aerobic activity 3 - 4 days a week.  · Stretching and strength exercises 2 - 3 days a week.  Contact a doctor if:  · You have any of the following symptoms while exercising:  ? Pain, pressure, or burning in your chest, jaw, shoulder, or back (angina).  ? Lightheadedness.  ? Dizziness.  ? Irregular or fast heartbeat.  ? Shortness of breath.  · You are extremely tired after exercising.  Get help right away if:  · You have angina that does not get better with medicine and lasts for more than 5 minutes.  · You have nausea or you vomit.  · You have excessive sweating that is not caused by exercise.  Summary  · When you are recovering from a heart condition, it is important to have heart-healthy habits, including exercise routines.  · At first, you may begin exercising under supervision, such as at a hospital or clinic. Over time, you may begin exercising at home, with your health care provider's approval.  · Aim for 40 minutes of aerobic exercises 3 - 4 days a week.  · Aim to do stretching and strength exercises 2 - 3 days a week.  · Choose a pace that is comfortable for you. You should be able to talk while exercising.  This information is not intended to replace advice given to you by your health care provider. Make sure you discuss any questions you have with your health care provider.  Document Released: 12/23/2014 Document Revised: 11/30/2018 Document Reviewed: 11/17/2017  Elsevier Patient  Education © 2020 [employer]      Radial Site Care    This sheet gives you information about how to care for yourself after your procedure. Your health care provider may also give you more specific instructions. If you have problems or questions, contact your health care provider.  What can I expect after the procedure?  After the procedure, it is common to have:  · Bruising and tenderness at the catheter insertion area.  Follow these instructions at home:  Medicines  · Take over-the-counter and prescription medicines only as told by your health care provider.  Insertion site care  · Follow instructions from your health care provider about how to take care of your insertion site. Make sure you:  ? Wash your hands with soap and water before you change your bandage (dressing). If soap and water are not available, use hand .  ? Change your dressing as told by your health care provider.  ? Leave stitches (sutures), skin glue, or adhesive strips in place. These skin closures may need to stay in place for 2 weeks or longer. If adhesive strip edges start to loosen and curl up, you may trim the loose edges. Do not remove adhesive strips completely unless your health care provider tells you to do that.  · Check your insertion site every day for signs of infection. Check for:  ? Redness, swelling, or pain.  ? Fluid or blood.  ? Pus or a bad smell.  ? Warmth.  · Do not take baths, swim, or use a hot tub until your health care provider approves.  · You may shower 24-48 hours after the procedure, or as directed by your health care provider.  ? Remove the dressing and gently wash the site with plain soap and water.  ? Pat the area dry with a clean towel.  ? Do not rub the site. That could cause bleeding.  · Do not apply powder or lotion to the site.  Activity    · For 24 hours after the procedure, or as directed by your health care provider:  ? Do not flex or bend the affected arm.  ? Do not push or pull heavy objects  with the affected arm.  ? Do not drive yourself home from the hospital or clinic. You may drive 24 hours after the procedure unless your health care provider tells you not to.  ? Do not operate machinery or power tools.  · Do not lift anything that is heavier than 10 lb (4.5 kg), or the limit that you are told, until your health care provider says that it is safe.  · Ask your health care provider when it is okay to:  ? Return to work or school.  ? Resume usual physical activities or sports.  ? Resume sexual activity.  General instructions  · If the catheter site starts to bleed, raise your arm and put firm pressure on the site. If the bleeding does not stop, get help right away. This is a medical emergency.  · If you went home on the same day as your procedure, a responsible adult should be with you for the first 24 hours after you arrive home.  · Keep all follow-up visits as told by your health care provider. This is important.  Contact a health care provider if:  · You have a fever.  · You have redness, swelling, or yellow drainage around your insertion site.  Get help right away if:  · You have unusual pain at the radial site.  · The catheter insertion area swells very fast.  · The insertion area is bleeding, and the bleeding does not stop when you hold steady pressure on the area.  · Your arm or hand becomes pale, cool, tingly, or numb.  These symptoms may represent a serious problem that is an emergency. Do not wait to see if the symptoms will go away. Get medical help right away. Call your local emergency services (911 in the [place] Do not drive yourself to the hospital.  Summary  · After the procedure, it is common to have bruising and tenderness at the site.  · Follow instructions from your health care provider about how to take care of your radial site wound. Check the wound every day for signs of infection.  · Do not lift anything that is heavier than 10 lb (4.5 kg), or the limit that you are told, until  your health care provider says that it is safe.  This information is not intended to replace advice given to you by your health care provider. Make sure you discuss any questions you have with your health care provider.  Document Released: 01/20/2012 Document Revised: 01/23/2019 Document Reviewed: 01/23/2019  [employer] Patient Education © 2020 [employer]      Heart Failure Action Plan  A heart failure action plan helps you understand what to do when you have symptoms of heart failure. Follow the plan that was created by you and your health care provider. Review your plan each time you visit your health care provider.  Red zone  These signs and symptoms mean you should get medical help right away:  · You have trouble breathing when resting.  · You have a dry cough that is getting worse.  · You have swelling or pain in your legs or abdomen that is getting worse.  · You suddenly gain more than 2-3 lb (0.9-1.4 kg) in a day, or more than 5 lb (2.3 kg) in one week. This amount may be more or less depending on your condition.  · You have trouble staying awake or you feel confused.  · You have chest pain.  · You do not have an appetite.  · You pass out.  If you experience any of these symptoms:  · Call your local emergency services (911 in the [place] right away or seek help at the emergency department of the nearest hospital.  Yellow zone  These signs and symptoms mean your condition may be getting worse and you should make some changes:  · You have trouble breathing when you are active or you need to sleep with extra pillows.  · You have swelling in your legs or abdomen.  · You gain 2-3 lb (0.9-1.4 kg) in one day, or 5 lb (2.3 kg) in one week. This amount may be more or less depending on your condition.  · You get tired easily.  · You have trouble sleeping.  · You have a dry cough.  If you experience any of these symptoms:  · Contact your health care provider within the next day.  · Your health care provider may adjust  your medicines.  Green zone  These signs mean you are doing well and can continue what you are doing:  · You do not have shortness of breath.  · You have very little swelling or no new swelling.  · Your weight is stable (no gain or loss).  · You have a normal activity level.  · You do not have chest pain or any other new symptoms.  Follow these instructions at home:  · Take over-the-counter and prescription medicines only as told by your health care provider.  · Weigh yourself daily. Your target weight is __________ lb (__________ kg).  ? Call your health care provider if you gain more than __________ lb (__________ kg) in a day, or more than __________ lb (__________ kg) in one week.  · Eat a heart-healthy diet. Work with a diet and nutrition specialist (dietitian) to create an eating plan that is best for you.  · Keep all follow-up visits as told by your health care provider. This is important.  Where to find more information  · American Heart Association: www.heart.org  Summary  · Follow the action plan that was created by you and your health care provider.  · Get help right away if you have any symptoms in the Red zone.  This information is not intended to replace advice given to you by your health care provider. Make sure you discuss any questions you have with your health care provider.  Document Released: 01/27/2018 Document Revised: 11/30/2018 Document Reviewed: 01/27/2018  Elsevier Patient Education © 2020 [employer] Inc[employer]

## 2022-01-12 NOTE — DISCHARGE PLANNING
Meds-to-Beds approved services for 30 prasugrel (EFFIENT) 10 MG Tab is $11.79. Approved Services sent to [id number].

## 2022-01-12 NOTE — HEART FAILURE PROGRAM
Primary reason for admission is STEMI. However, patient has been found to have cardiomyopathy and was diagnosed with heart failure. Post revascularization, EF has improved from 30% to 45%.    Heart failure with preserved ejection fraction (HFpEF).   • Residence: correction[facility] facility  • Insurance: correctional care  • [facility] Paramedicine Program Eligible?: no  [email or web address]    DM dx? no  Atrial arrhythmia dx? no  Current smoker? This has not been addressed    Nurses: HF has been added as a title to the education tab and to the care plan. Would you please take credit for the great work that you're doing by documenting in these? Thank you!    Providers: below are Guideline Directed Medical Therapy (GDMT) for HFpEF. If any cannot be prescribed by discharge, would you please note the clinical reason for each in your discharge summary? Thanks! Alicia    • Anticoagulation for atrial arrhythmia, if applicable  • Glycemic control for DM + HF  • Lipid lowering medication for DM + HF  • Pneumococcal vaccine  • Influenza vaccine for the current flu season  • Smoking cessation counseling, if applicable  Acute Referral to disease management program specializing in heart failure   are

## 2022-01-13 NOTE — HEART FAILURE PROGRAM
Discharge Order Check Up - HFpEF    I've reached out to the Cardiology Practice Manager to see about arranging the telemedicine appointment that the cardiology note filed today requests.    HF Education Documented? No, title added to the education tab    Where we stand right now on HFpEF MEASURES    • Anticoagulation for atrial arrhythmia, if applicable: n/a  • Glycemic control for DM + HF: n/a  • Lipid lowering medication for DM + HF: n/a- patient is prescribed atorvastatin for ACS  • Pneumococcal vaccine: missing  • Influenza vaccine for the current flu season: missing  • Smoking cessation counseling: per Dr. Flynn's DC summary today, patient is a remote smoker.  • Referral to disease management program specializing in heart failure care: I've addressed this in the AVS

## 2022-01-15 NOTE — DOCUMENTATION QUERY
"                                                                         [facility]                                                                       Query Response Note      PATIENT:               SUZIE MULLINS  ACCT #:                  [id number]  MRN:                     [id number]  :                      1970  ADMIT DATE:       1/10/2022 11:37 AM  DISCH DATE:        2022 4:40 PM  RESPONDING  PROVIDER #:        [id number]           QUERY TEXT:    Systolic Heart Failure is documented in the Medical Record. Please document the type and acuity (includes probable or suspected).     NOTE:  If an appropriate response is not listed below, please respond with a new note.    If you have any questions please contact:  Sharla Fernando RN CDI [facility]  [email or web address]  Sharla Fernando [address]      The patient's Clinical Indicators include:  51 year old male admitted for STEMI and left heart catheterization with stent placement.     Isiah  \"Systolic heart failure (HCC)\"   Gee \" Systolic heart failure\"    \"Severe LV systolic dysfunction at 30% LVEF, with apical akinesis-suggesting superimposed Takotsubo cardiomyopathy. \"    Risk factors: HTN, STEMI  Treatment: ECHO, left heart cath, tele,  Options provided:   -- Acute Systolic heart failure   -- Acute on Chronic Systolic heart failure   -- Unable to determine      Query created by: Sharla Fernando on 2022 8:43 AM    RESPONSE TEXT:    Acute Systolic heart failure          Electronically signed by:  HOLLY CURTIS MD 1/15/2022 8:44 AM              "

## 2022-01-28 ENCOUNTER — OFFICE VISIT (OUTPATIENT)
Dept: CARDIOLOGY | Facility: MEDICAL CENTER | Age: 52
End: 2022-01-28
Payer: COMMERCIAL

## 2022-01-28 VITALS
HEART RATE: 74 BPM | HEIGHT: 69 IN | SYSTOLIC BLOOD PRESSURE: 110 MMHG | WEIGHT: 222 LBS | OXYGEN SATURATION: 98 % | DIASTOLIC BLOOD PRESSURE: 70 MMHG | BODY MASS INDEX: 32.88 KG/M2

## 2022-01-28 DIAGNOSIS — I50.21 ACUTE SYSTOLIC HEART FAILURE (HCC): ICD-10-CM

## 2022-01-28 DIAGNOSIS — I21.19 ST ELEVATION MYOCARDIAL INFARCTION (STEMI) INVOLVING OTHER CORONARY ARTERY OF INFERIOR WALL (HCC): ICD-10-CM

## 2022-01-28 DIAGNOSIS — I21.29 ST ELEVATION MYOCARDIAL INFARCTION (STEMI) INVOLVING OTHER CORONARY ARTERY (HCC): ICD-10-CM

## 2022-01-28 DIAGNOSIS — E78.5 HYPERLIPIDEMIA, UNSPECIFIED HYPERLIPIDEMIA TYPE: ICD-10-CM

## 2022-01-28 DIAGNOSIS — I10 PRIMARY HYPERTENSION: ICD-10-CM

## 2022-01-28 PROCEDURE — 99214 OFFICE O/P EST MOD 30 MIN: CPT | Performed by: NURSE PRACTITIONER

## 2022-01-28 RX ORDER — ATORVASTATIN CALCIUM 40 MG/1
40 TABLET, FILM COATED ORAL EVERY EVENING
Qty: 90 TABLET | Refills: 3 | COMMUNITY
Start: 2022-01-28

## 2022-01-28 ASSESSMENT — ENCOUNTER SYMPTOMS
ORTHOPNEA: 0
PALPITATIONS: 0
ABDOMINAL PAIN: 0
MYALGIAS: 0
CLAUDICATION: 0
DIZZINESS: 0
FEVER: 0
PND: 0
COUGH: 0
SHORTNESS OF BREATH: 0

## 2022-01-28 ASSESSMENT — FIBROSIS 4 INDEX: FIB4 SCORE: 0.82

## 2022-01-28 NOTE — PROGRESS NOTES
Subjective     Regino Francis is a 51 y.o. male who presents today for hospital follow up post STEMI with placement of KENNY to circumflex.    He is a new patient to our office. Hx of HTN. He now has CAD with KENNY placement, ischemic cardiomyopathy, and HLD.    He presents today as a prisoner with guards present. He is very educated on his heart condition and has many questions regarding his heart health. We discussed all his questions in detail today.    He walks for an hour or more in his cell. Unable to leave cell due to quarantine protocol.    He has no chest pain, shortness of breath, edema, dizziness/lightheadedness, or palpitations.    History reviewed. No pertinent past medical history.  History reviewed. No pertinent surgical history.  History reviewed. No pertinent family history.  Social History     Socioeconomic History   • Marital status: Unknown     Spouse name: Not on file   • Number of children: Not on file   • Years of education: Not on file   • Highest education level: Not on file   Occupational History   • Not on file   Tobacco Use   • Smoking status: Not on file   • Smokeless tobacco: Not on file   Substance and Sexual Activity   • Alcohol use: Not on file   • Drug use: Not on file   • Sexual activity: Not on file   Other Topics Concern   • Not on file   Social History Narrative   • Not on file     Social Determinants of Health     Financial Resource Strain:    • Difficulty of Paying Living Expenses: Not on file   Food Insecurity:    • Worried About Running Out of Food in the Last Year: Not on file   • Ran Out of Food in the Last Year: Not on file   Transportation Needs:    • Lack of Transportation (Medical): Not on file   • Lack of Transportation (Non-Medical): Not on file   Physical Activity:    • Days of Exercise per Week: Not on file   • Minutes of Exercise per Session: Not on file   Stress:    • Feeling of Stress : Not on file   Social Connections:    • Frequency of Communication with  Friends and Family: Not on file   • Frequency of Social Gatherings with Friends and Family: Not on file   • Attends Restoration Services: Not on file   • Active Member of Clubs or Organizations: Not on file   • Attends Club or Organization Meetings: Not on file   • Marital Status: Not on file   Intimate Partner Violence:    • Fear of Current or Ex-Partner: Not on file   • Emotionally Abused: Not on file   • Physically Abused: Not on file   • Sexually Abused: Not on file   Housing Stability:    • Unable to Pay for Housing in the Last Year: Not on file   • Number of Places Lived in the Last Year: Not on file   • Unstable Housing in the Last Year: Not on file     Allergies   Allergen Reactions   • Penicillins Unspecified     Unknown reaction from childhood     Outpatient Encounter Medications as of 1/28/2022   Medication Sig Dispense Refill   • atorvastatin (LIPITOR) 40 MG Tab Take 1 Tablet by mouth every evening. 90 Tablet 3   • aspirin EC 81 MG EC tablet Take 1 Tablet by mouth every day. 30 Tablet    • carvedilol (COREG) 6.25 MG Tab Take 1 Tablet by mouth 2 times a day with meals. 60 Tablet 0   • lisinopril (PRINIVIL) 2.5 MG Tab Take 1 Tablet by mouth 2 times a day. 30 Tablet 0   • prasugrel (EFFIENT) 10 MG Tab Take 1 Tablet by mouth every day. 30 Tablet 0   • triamcinolone acetonide (KENALOG) 0.025 % Cream Apply 1 Application topically 2 times a day as needed (Itching or Dermatitis).     • calcium polycarbophil (FIBERCON) 625 MG Tab Take 1,250 mg by mouth 1 time a day as needed (Constipation). 2 tablets = 1,250 mg.     • capsaicin (ZOSTRIX) 0.025 % cream Apply 1 Application topically 3 times a day as needed (Muscle Spasms).     • [DISCONTINUED] atorvastatin (LIPITOR) 80 MG tablet Take 0.5 Tablets by mouth every evening. 30 Tablet    • [DISCONTINUED] atorvastatin (LIPITOR) 20 MG Tab Take 20 mg by mouth every evening.     • [DISCONTINUED] ibuprofen (MOTRIN) 600 MG Tab Take 600 mg by mouth 3 times a day as needed for  "Moderate Pain.       No facility-administered encounter medications on file as of 1/28/2022.     Review of Systems   Constitutional: Negative for fever and malaise/fatigue.   Respiratory: Negative for cough and shortness of breath.    Cardiovascular: Negative for chest pain, palpitations, orthopnea, claudication, leg swelling and PND.   Gastrointestinal: Negative for abdominal pain.   Musculoskeletal: Negative for myalgias.   Neurological: Negative for dizziness.              Objective     /70 (BP Location: Left arm, Patient Position: Sitting, BP Cuff Size: Adult)   Pulse 74   Ht 1.753 m (5' 9\")   Wt 101 kg (222 lb)   SpO2 98%   BMI 32.78 kg/m²     Physical Exam  Vitals and nursing note reviewed.   Constitutional:       Appearance: Normal appearance. He is well-developed. He is obese.   HENT:      Head: Normocephalic and atraumatic.   Neck:      Vascular: No JVD.   Cardiovascular:      Rate and Rhythm: Normal rate and regular rhythm.      Heart sounds: Normal heart sounds.   Pulmonary:      Effort: Pulmonary effort is normal.      Breath sounds: Normal breath sounds.   Musculoskeletal:         General: Normal range of motion.   Skin:     General: Skin is warm and dry.      Capillary Refill: Capillary refill takes less than 2 seconds.   Neurological:      General: No focal deficit present.      Mental Status: He is alert and oriented to person, place, and time. Mental status is at baseline.      Sensory: Sensory deficit:    Psychiatric:         Mood and Affect: Mood normal.         Behavior: Behavior normal.         Thought Content: Thought content normal.         Judgment: Judgment normal.                Assessment & Plan     1. ST elevation myocardial infarction (STEMI) involving other coronary artery (HCC)  Comp Metabolic Panel    Lipid Profile   2. Hyperlipidemia, unspecified hyperlipidemia type     3. Primary hypertension     4. Acute systolic heart failure (HCC)  EC-ECHOCARDIOGRAM COMPLETE W/O CONT "   5. ST elevation myocardial infarction (STEMI) involving other coronary artery of inferior wall (HCC)  atorvastatin (LIPITOR) 40 MG Tab       Medical Decision Making: Today's Assessment/Status/Plan:      1. CAD with recent KENNY to circ with associated ischemic cardiomyopathy  -no HF symptoms, no angina or WHITTAKER  -cont asa, statin lifelong  -cont effient 1 year post stent placement  -cont coreg, lisinopril   -repeat echo in 3 months for function post MI  -handouts on disease process given to patient    2. HTN  -good control on coreg and lisinopril  -cont current dosing  -BP goal <130/80    3. HLD  -statin  -LDL goal <70 with CAD  -reduce atorvastatin to 40 mg QPM  -repeat lipid/cmp in 3 months    4. Obesity  -work on diet/exercise for weight loss  -aerobic activity 30 minutes 3 times weekly per AHA guidelines  -handouts on diet and exercise given to patient    Patient is to follow up with Ellyn CENTENO in 3 months with echo and labs.

## 2022-03-17 ENCOUNTER — HOSPITAL ENCOUNTER (EMERGENCY)
Facility: MEDICAL CENTER | Age: 52
End: 2022-03-17
Payer: COMMERCIAL

## 2022-04-04 ENCOUNTER — TELEPHONE (OUTPATIENT)
Dept: CARDIOLOGY | Facility: MEDICAL CENTER | Age: 52
End: 2022-04-04
Payer: COMMERCIAL

## 2022-04-04 NOTE — TELEPHONE ENCOUNTER
DG Caruso RN at [facility] called stating Pt missed his appt at [place] for his echo. Zuly is going to try to reschedule him before his upcoming appt with SC but wants to know if she is not able to schedule the echo before 4/19, does SC still want to see Pt. Please call Zuly back at [phone]    Thank you

## 2022-04-05 NOTE — TELEPHONE ENCOUNTER
Called Zuly BLACK at [facility] and let her know that per SC she can still see patient if the echo appointment cannot be made before coming to clinic. Zuly states she made the appointment at [place] for the 25th of April which is okay. When results are received SC will call Zuly at [facility] with results for patient when available

## 2022-05-12 ENCOUNTER — TELEPHONE (OUTPATIENT)
Dept: CARDIOLOGY | Facility: MEDICAL CENTER | Age: 52
End: 2022-05-12
Payer: COMMERCIAL

## 2022-05-12 NOTE — TELEPHONE ENCOUNTER
----- Message from Fernanda Manzano sent at 5/11/2022  9:40 AM PDT -----  Regarding: Dr call  Hello,     I just received a call from Tc from [facility]3], they have a questions about changing the Pts Rx. If you can please call back @[phone] ext [phone].     Thanks!

## 2022-05-13 NOTE — TELEPHONE ENCOUNTER
Caller: Nurse Bower  Topic/issue: Returning call from [facility]  Callback Number: [phone] [facility] [address]

## 2022-05-13 NOTE — TELEPHONE ENCOUNTER
Echo results from 4/25/22 requested from [facility] at fax # [phone]. Fax confirmation received. Records pending.

## 2022-05-19 ENCOUNTER — OFFICE VISIT (OUTPATIENT)
Dept: CARDIOLOGY | Facility: MEDICAL CENTER | Age: 52
End: 2022-05-19
Payer: COMMERCIAL

## 2022-05-19 VITALS
HEART RATE: 58 BPM | SYSTOLIC BLOOD PRESSURE: 110 MMHG | BODY MASS INDEX: 31.25 KG/M2 | DIASTOLIC BLOOD PRESSURE: 80 MMHG | WEIGHT: 211 LBS | HEIGHT: 69 IN | RESPIRATION RATE: 14 BRPM | OXYGEN SATURATION: 97 %

## 2022-05-19 DIAGNOSIS — E78.5 HYPERLIPIDEMIA, UNSPECIFIED HYPERLIPIDEMIA TYPE: ICD-10-CM

## 2022-05-19 DIAGNOSIS — I21.29 ST ELEVATION MYOCARDIAL INFARCTION (STEMI) INVOLVING OTHER CORONARY ARTERY (HCC): ICD-10-CM

## 2022-05-19 DIAGNOSIS — I50.21 ACUTE SYSTOLIC HEART FAILURE (HCC): ICD-10-CM

## 2022-05-19 DIAGNOSIS — I21.19 ST ELEVATION MYOCARDIAL INFARCTION (STEMI) INVOLVING OTHER CORONARY ARTERY OF INFERIOR WALL (HCC): ICD-10-CM

## 2022-05-19 DIAGNOSIS — I10 PRIMARY HYPERTENSION: ICD-10-CM

## 2022-05-19 PROCEDURE — 99214 OFFICE O/P EST MOD 30 MIN: CPT | Performed by: NURSE PRACTITIONER

## 2022-05-19 RX ORDER — LOSARTAN POTASSIUM 25 MG/1
25 TABLET ORAL DAILY
Qty: 30 TABLET | Refills: 11 | Status: SHIPPED | OUTPATIENT
Start: 2022-05-19

## 2022-05-19 RX ORDER — CARVEDILOL 6.25 MG/1
6.25 TABLET ORAL 2 TIMES DAILY WITH MEALS
Qty: 60 TABLET | Refills: 0 | Status: SHIPPED | OUTPATIENT
Start: 2022-05-19

## 2022-05-19 ASSESSMENT — ENCOUNTER SYMPTOMS
FEVER: 0
SHORTNESS OF BREATH: 0
PALPITATIONS: 0
MYALGIAS: 0
PND: 0
DIZZINESS: 1
CLAUDICATION: 0
ORTHOPNEA: 0
ABDOMINAL PAIN: 0
COUGH: 0

## 2022-05-19 ASSESSMENT — FIBROSIS 4 INDEX: FIB4 SCORE: 0.82

## 2022-05-19 NOTE — PROGRESS NOTES
Subjective     Regino Francis is a 51 y.o. male who presents today for 3 month follow up post STEMI with placement of KENNY to circumflex.    Hx of HTN. He now has CAD with KENNY placement, ischemic cardiomyopathy (now resolved), and HLD.     He presents today as a prisoner with guards present. He is very educated on his heart condition and has many questions regarding his heart health. We discussed all his questions in detail today.    He does have some mild lightheadedness, he will watch his BP and continue to see if its lower than normal and could be causing his lightheadedness.    He has no chest pain, shortness of breath, edema, or palpitations.    History reviewed. No pertinent past medical history.  History reviewed. No pertinent surgical history.  History reviewed. No pertinent family history.  Social History     Socioeconomic History   • Marital status: Unknown     Spouse name: Not on file   • Number of children: Not on file   • Years of education: Not on file   • Highest education level: Not on file   Occupational History   • Not on file   Tobacco Use   • Smoking status: Former Smoker   • Smokeless tobacco: Never Used   Vaping Use   • Vaping Use: Never used   Substance and Sexual Activity   • Alcohol use: Never   • Drug use: Never   • Sexual activity: Not on file   Other Topics Concern   • Not on file   Social History Narrative   • Not on file     Social Determinants of Health     Financial Resource Strain: Not on file   Food Insecurity: Not on file   Transportation Needs: Not on file   Physical Activity: Not on file   Stress: Not on file   Social Connections: Not on file   Intimate Partner Violence: Not on file   Housing Stability: Not on file     Allergies   Allergen Reactions   • Penicillins Unspecified     Unknown reaction from childhood   • Lisinopril      Dry cough      Outpatient Encounter Medications as of 5/19/2022   Medication Sig Dispense Refill   • carvedilol (COREG) 6.25 MG Tab Take 1 Tablet by  "mouth 2 times a day with meals. 60 Tablet 0   • losartan (COZAAR) 25 MG Tab Take 1 Tablet by mouth every day. 30 Tablet 11   • atorvastatin (LIPITOR) 40 MG Tab Take 1 Tablet by mouth every evening. 90 Tablet 3   • aspirin EC 81 MG EC tablet Take 1 Tablet by mouth every day. 30 Tablet    • prasugrel (EFFIENT) 10 MG Tab Take 1 Tablet by mouth every day. 30 Tablet 0   • calcium polycarbophil (FIBERCON) 625 MG Tab Take 1,250 mg by mouth 1 time a day as needed (Constipation). 2 tablets = 1,250 mg.     • [DISCONTINUED] carvedilol (COREG) 6.25 MG Tab Take 1 Tablet by mouth 2 times a day with meals. (Patient not taking: Reported on 5/19/2022) 60 Tablet 0   • [DISCONTINUED] lisinopril (PRINIVIL) 2.5 MG Tab Take 1 Tablet by mouth 2 times a day. (Patient not taking: Reported on 5/19/2022) 30 Tablet 0   • [DISCONTINUED] triamcinolone acetonide (KENALOG) 0.025 % Cream Apply 1 Application topically 2 times a day as needed (Itching or Dermatitis). (Patient not taking: Reported on 5/19/2022)     • [DISCONTINUED] capsaicin (ZOSTRIX) 0.025 % cream Apply 1 Application topically 3 times a day as needed (Muscle Spasms). (Patient not taking: Reported on 5/19/2022)       No facility-administered encounter medications on file as of 5/19/2022.     Review of Systems   Constitutional: Negative for fever and malaise/fatigue.   Respiratory: Negative for cough and shortness of breath.    Cardiovascular: Negative for chest pain, palpitations, orthopnea, claudication, leg swelling and PND.   Gastrointestinal: Negative for abdominal pain.   Musculoskeletal: Negative for myalgias.   Neurological: Positive for dizziness.        Intermittent              Objective     /80 (BP Location: Left arm, Patient Position: Sitting, BP Cuff Size: Adult)   Pulse (!) 58   Resp 14   Ht 1.753 m (5' 9\")   Wt 95.7 kg (211 lb)   SpO2 97%   BMI 31.16 kg/m²     Physical Exam  Vitals and nursing note reviewed.   Constitutional:       Appearance: Normal " appearance. He is well-developed. He is obese.   HENT:      Head: Normocephalic and atraumatic.   Neck:      Vascular: No JVD.   Cardiovascular:      Rate and Rhythm: Normal rate and regular rhythm.      Heart sounds: Normal heart sounds.   Pulmonary:      Effort: Pulmonary effort is normal.      Breath sounds: Normal breath sounds.   Musculoskeletal:         General: Normal range of motion.   Skin:     General: Skin is warm and dry.      Capillary Refill: Capillary refill takes less than 2 seconds.   Neurological:      General: No focal deficit present.      Mental Status: He is alert and oriented to person, place, and time. Mental status is at baseline.      Sensory: Sensory deficit:    Psychiatric:         Mood and Affect: Mood normal.         Behavior: Behavior normal.         Thought Content: Thought content normal.         Judgment: Judgment normal.                Assessment & Plan     1. Hyperlipidemia, unspecified hyperlipidemia type     2. Primary hypertension     3. Acute systolic heart failure (HCC)     4. ST elevation myocardial infarction (STEMI) involving other coronary artery (HCC)     5. ST elevation myocardial infarction (STEMI) involving other coronary artery of inferior wall (HCC)  carvedilol (COREG) 6.25 MG Tab       Medical Decision Making: Today's Assessment/Status/Plan:      1. CAD with KENNY to circ with associated ischemic cardiomyopathy (now resolved)  -no HF symptoms, no angina or WHITTAKER  -cont asa, statin lifelong  -cont effient 1 year post stent placement  -cont coreg, losartan-OK to reduce or wean off losartan for lightheadedness associated with low BP readings  -repeat echo shows improvement in EF     2. HTN  -good control on coreg and losartan  -cont current dosing unless lightheadedness with low BP persists, see above  -BP goal <130/80    3. HLD  -LDL goal <70 with CAD  -cont atorvastatin 40 mg QPM  -repeat lipid/cmp annually    4. Obesity  -work on diet/exercise for weight loss  -aerobic  activity 30 minutes 3 times weekly per AHA guidelines    Patient is to follow up with Ellyn CENTENO in 6 months for follow up on BP and lightheadedness.